# Patient Record
Sex: FEMALE | Race: WHITE
[De-identification: names, ages, dates, MRNs, and addresses within clinical notes are randomized per-mention and may not be internally consistent; named-entity substitution may affect disease eponyms.]

---

## 2017-09-24 NOTE — UC
Respiratory Complaint HPI





- HPI Summary


HPI Summary: 





26 Y/O female being seen for C/O cough, congestion, and feeling of fullness in 

sinuses and ears. Denies fever, chills, dyspnea, abdominal pain or nausea. 

States has had symptoms for approximately 1 week. Appears well, in NAD. Medical 

history and medications reviewed. 





- History of Current Complaint


Chief Complaint: UCSTDScreening


Stated Complaint: CONGESTION


Time Seen by Provider: 09/24/17 10:34


Hx Obtained From: Patient


Hx Last Menstrual Period: 8/31/17


Onset/Duration: Gradual Onset


Timing: Constant


Severity Initially: Mild


Severity Currently: Mild


Pain Intensity: 2


Pain Scale Used: 0-10 Numeric


Character: Cough: Productive


Aggravating Factors: Nothing


Associated Signs And Symptoms: Positive: Nasal Congestion





- Risk Factors


Pulmonary Embolism Risk Factors: Negative


Cardiac Risk Factors: Smoking


Pseudomonas Risk Factors: Negative


Tuberculosis Risk Factors: Negative





- Allergies/Home Medications


Allergies/Adverse Reactions: 


 Allergies











Allergy/AdvReac Type Severity Reaction Status Date / Time


 


No Known Allergies Allergy   Verified 09/24/17 10:33














PMH/Surg Hx/FS Hx/Imm Hx


Previously Healthy: Yes





- Surgical History


Surgical History: None





- Social History


Alcohol Use: None


Substance Use Type: None


Smoking Status (MU): Heavy Every Day Tobacco Smoker





Review of Systems


Constitutional: Fatigue


Skin: Negative


Eyes: Negative


ENT: Sore Throat, Sinus Congestion


Respiratory: Negative


Cardiovascular: Negative


Gastrointestinal: Negative


Genitourinary: Negative


Motor: Negative


Neurovascular: Negative


Musculoskeletal: Negative


Neurological: Negative


Psychological: Negative


Is Patient Immunocompromised?: No


All Other Systems Reviewed And Are Negative: Yes





Physical Exam


Triage Information Reviewed: Yes


Appearance: Well-Appearing


Vital Signs: 


 Initial Vital Signs











Temp  98.7 F   09/24/17 10:34


 


Pulse  85   09/24/17 10:34


 


Resp  18   09/24/17 10:34


 


BP  124/74   09/24/17 10:34


 


Pulse Ox  100   09/24/17 10:34











Vital Signs Reviewed: Yes


Eye Exam: Normal


Eyes: Positive: Conjunctiva Clear


ENT Exam: Other


ENT: Positive: Nasal drainage


Neck exam: Normal


Neck: Positive: Supple, No Lymphadenopathy


Respiratory Exam: Normal


Respiratory: Positive: Lungs clear, Normal breath sounds


Cardiovascular Exam: Normal


Cardiovascular: Positive: RRR


Abdominal Exam: Normal


Abdomen Description: Positive: Nontender


Bowel Sounds: Positive: Present


Musculoskeletal Exam: Normal


Musculoskeletal: Positive: Strength Intact


Neurological Exam: Normal


Neurological: Positive: Alert


Psychological Exam: Normal


Skin Exam: Normal





UC Diagnostic Evaluation





- Laboratory


O2 Sat by Pulse Oximetry: 100





Respiratory Course/Dx





- Differential Dx/Diagnosis


Differential Diagnosis/HQI/PQRI: Bronchitis, Lower Resp Infection


Provider Diagnoses: Upper respiratory Viral infection





Discharge





- Discharge Plan


Condition: Stable


Disposition: HOME


Patient Education Materials:  Upper Respiratory Infection (ED)


Additional Instructions: 


You likely have a viral illness, continue to drink plenty of fluids. Return to 

urgent care for increased symptoms (cough, fever, throat pain with difficulty 

swallowing, or shortness of breath). Take Mucinex as directed, increase fluids. 

Smoking cessation information provided.

## 2018-01-08 NOTE — UC
Throat Pain/Nasal Titi HPI





- HPI Summary


HPI Summary: 





Pt presents with sore throat and sinus congestion that started yesterday. She 

is concerned because she knows strep and flu are going around. Has not taken 

anything for the discomfort. Denies fever, chills, cough, SOB, chest pain, 

abdominal pain, n/v/d/c. She is still smoking








- History of Current Complaint


Chief Complaint: UCRespiratory


Stated Complaint: THROAT COMPLAINT


Time Seen by Provider: 01/08/18 12:23


Hx Obtained From: Patient


Hx Last Menstrual Period: once every 3 mo d/t bc


Onset/Duration: Gradual Onset


Severity: Moderate


Pain Intensity: 4


Pain Scale Used: 0-10 Numeric





- Allergies/Home Medications


Allergies/Adverse Reactions: 


 Allergies











Allergy/AdvReac Type Severity Reaction Status Date / Time


 


No Known Allergies Allergy   Verified 09/24/17 10:33











Home Medications: 


 Home Medications





Levonorgestrel-Ethinyl Estradi [Seasonique] 1 tab PO 01/08/18 [History]











PMH/Surg Hx/FS Hx/Imm Hx


Previously Healthy: Yes





- Surgical History


Surgical History: None





- Social History


Lives: With Family


Alcohol Use: Occasionally


Substance Use Type: None


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


Smoking Status (MU): Heavy Every Day Tobacco Smoker


Type: Cigarettes


Cessation Counseling: Counseled 3+Min - 10 Min





Review of Systems


Constitutional: Negative


Skin: Negative


Eyes: Negative


ENT: Sore Throat, Nasal Discharge, Sinus Congestion, Sinus Pain/Tenderness


Respiratory: Negative


Cardiovascular: Negative


Gastrointestinal: Negative


All Other Systems Reviewed And Are Negative: Yes





Physical Exam


Triage Information Reviewed: Yes


Appearance: Well-Appearing, No Pain Distress, Well-Nourished


Vital Signs: 


 Initial Vital Signs











Temp  98.5 F   01/08/18 11:03


 


Pulse  90   01/08/18 11:03


 


Resp  18   01/08/18 11:03


 


BP  122/77   01/08/18 11:03


 


Pulse Ox  100   01/08/18 11:03











Vital Signs Reviewed: Yes


Eyes: Positive: Conjunctiva Clear.  Negative: Conjunctiva Inflamed, Discharge


ENT: Positive: Hearing grossly normal, Pharynx normal, Nasal congestion, Nasal 

drainage, TMs normal, Sinus tenderness, Uvula midline.  Negative: Pharyngeal 

erythema, TM bulging, TM dull, TM red, Tonsillar swelling, Tonsillar exudate, 

Hoarse voice


Neck: Positive: Supple, Nontender, No Lymphadenopathy


Respiratory: Positive: Chest non-tender, Lungs clear, Normal breath sounds, No 

respiratory distress, No accessory muscle use


Cardiovascular: Positive: RRR, No Murmur, Pulses Normal


Neurological: Positive: Alert


Psychological: Positive: Age Appropriate Behavior


Skin: Negative: rashes





Throat Pain/Nasal Course/Dx





- Course


Course Of Treatment: POC strep and flu negative.  Suspect viral illness, as it 

has been 2 days - I advised pt to try conservative measures such as rest, fluids

, and tylenol/ibuprofen for discomfort. No need for antibiotic at this time.





- Differential Dx/Diagnosis


Differential Diagnosis/HQI/PQRI: Influenza, Mononucleosis, Otitis Media, 

Pharyngitis, Sinusitis, Tonsillitis, URI


Provider Diagnoses: Sore throat.  Sinus congestion





Discharge





- Discharge Plan


Condition: Stable


Disposition: HOME


Patient Education Materials:  Viral Syndrome (ED)


Referrals: 


No Primary Care Phys,NOPCP [Primary Care Provider] - 


Additional Instructions: 


If you develop a fever, shortness of breath, chest pain, new or worsening 

symptoms - please call your PCP or go to the ED.





1) May take ibuprofen 400mg every 6-8 hours as needed for pain/discomfort.

## 2019-01-12 NOTE — ED
HPI Febrile Illness





- HPI Summary


HPI Summary: 


Patient is a 29 y/o F presenting to ED with complaints of general illness for 

the past two weeks. Patient's daughter is also ill. She states that [facility], was told that she had URI. Patient 

was sick for a couple of days afterwards and then states she got better. Four 

days ago, patient began to experience fever, cough, body aches, fatigue. Three 

days of bilateral calf pain is also reported. Patient notes that she has not 

had a fever for the past 1-2 days. She states that she feels better than 

previously but notes "I'm still not 100%". On triage, pain is rated 7/10, 

nothing is noted to aggravate/alleviate Sx. Home medications and allergies are 

reviewed. 








- History of Current Complaint


Chief Complaint: EDGeneral


Time Seen by Provider: 01/12/19 08:19


Hx Obtained From: Patient


Hx Last Menstrual Period: once every 3 mo d/t bc


Onset/Duration: Started Days Ago - fever, cough, body aches, fatigue four days 

ago, three days of bilateral calf pain, Started Weeks Ago - sore throat onset ~

2 weeks ago, Still Present


Timing: Lasting Days - fever, cough, body aches, fatigue four days ago, three 

days of bilateral calf pain, Lasting Weeks - sore throat onset ~2 weeks ago


Current Severity: Severe - 7/10


Pain Intensity: 7


Pain Scale Used: 0-10 Numeric - 7/10


Aggravating Factors: Nothing


Alleviating Factors: Nothing


Associated Signs and Symptoms: Cough, Myalgia - BODY ACHES, Sore Throat, Other: 

- POSITIVE - FATIGUE, BILATERAL CALF PAIN, FEVER





- Allergy/Home Medications


Allergies/Adverse Reactions: 


 Allergies











Allergy/AdvReac Type Severity Reaction Status Date / Time


 


No Known Allergies Allergy   Verified 01/12/19 08:14











Home Medications: 


 Home Medications





NK [No Home Medications Reported]  01/12/19 [History Confirmed 01/12/19]











PMH/Surg Hx/FS Hx/Imm Hx


Musculoskeletal History: Reports: Hx Scoliosis


Sensory History: 


   Denies: Hx Legally Blind, Hx Deafness


Opthamlomology History: 


   Denies: Hx Legally Blind


EENT History: 


   Denies: Hx Deafness


Infectious Disease History: No


Infectious Disease History: Reports: Hx Hepatitis - positive antibodies


   Denies: Hx Clostridium Difficile, Hx Human Immunodeficiency Virus (HIV), Hx 

of Known/Suspected MRSA, Hx Shingles, Hx Tuberculosis, Hx Known/Suspected VRE, 

Hx Known/Suspected VRSA, History Other Infectious Disease, Traveled Outside the 

[place] in Last 30 Days





- Family History


Known Family History: Positive: Cardiac Disease





- Social History


Alcohol Use: Occasionally


Substance Use Type: Reports: None


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


Smoking Status (MU): Heavy Every Day Tobacco Smoker


Type: Cigarettes





Review of Systems


Positive: Fever, Fatigue, Other - POSITIVE - BODY ACHES 


Positive: Sore Throat


Positive: Cough


Positive: Other - POSITIVE - BILATERAL CALF PAIN 


All Other Systems Reviewed And Are Negative: Yes





Physical Exam





- Summary


Physical Exam Summary: 


VITAL SIGNS: Reviewed.


GENERAL: Patient is a well-developed and nourished female who is lying 

comfortable in the stretcher. Patient is not in any acute respiratory distress.


HEAD AND FACE: No signs of trauma. No ecchymosis, hematomas or skull 

depressions. No sinus tenderness.


EYES: PERRLA, EOMI x 2, No injected conjunctiva, no nystagmus.


EARS: Hearing grossly intact. Ear canals and tympanic membranes are within 

normal limits.


MOUTH: Oropharynx within normal limits.


NECK: Supple, trachea is midline, no adenopathy, no JVD, no carotid bruit, no c-

spine tenderness, neck with full ROM.


CHEST: Symmetric, no tenderness at palpation


LUNGS: Clear to auscultation bilaterally. No wheezing or crackles.


CVS: Regular rate and rhythm, S1 and S2 present, no murmurs or gallops 

appreciated.


ABDOMEN: Soft, non-tender. No signs of distention. No rebound no guarding, and 

no masses palpated. Bowel sounds are normal.


EXTREMITIES: FROM in all major joints, no edema, no cyanosis or clubbing.


NEURO: Alert and oriented x 3. No acute neurological deficits. Speech is normal 

and follows commands.


SKIN: Dry and warm








Triage Information Reviewed: Yes


Vital Signs On Initial Exam: 


 Initial Vitals











Temp Pulse Resp BP Pulse Ox


 


 97.0 F   97   15   131/88   98 


 


 01/12/19 08:13  01/12/19 08:13  01/12/19 08:13  01/12/19 08:13  01/12/19 08:13











Vital Signs Reviewed: Yes





Diagnostics





- Vital Signs


 Vital Signs











  Temp Pulse Resp BP Pulse Ox


 


 01/12/19 08:13  97.0 F  97  15  131/88  98














- Laboratory


Lab Statement: Any lab studies that have been ordered have been reviewed, and 

results considered in the medical decision making process.





Re-Evaluation





- Re-Evaluation


  ** First Eval


Re-Evaluation Time: 09:50


Comment: I discussed all the findings and test results with the patient. 

Patient was instructed to return to the emergency room immediately if any of 

the symptoms return or worsens. Plan of care was discussed with the patient and 

understands and agrees. All questions were answered at patient satisfaction.  

There were no further complaints or concerns. Lung exam before discharge: CTA B/

L. Good air exchange. No wheezing or crackles heard. CVS: S1 and S2 present. No 

murmurs appreciated. Patient is alert and oriented x 3. Patient is 

hemodynamically stable. Patient will be discharged home with follow up PCP in 

the next 2-3 days





Course/Dx





- Course


Assessment/Plan: Patient is a 29 y/o F presenting to ED with complaints of 

general illness for the past two weeks. Patient's daughter is also ill. She 

states that she went to [facility] for sore throat on 12/29/18, was told 

that she had URI. Patient was sick for a couple of days afterwards and then 

states she got better. Four days ago, patient began to experience fever, cough, 

body aches, fatigue. Three days of bilateral calf pain is also reported. 

Patient notes that she has not had a fever for the past 1-2 days. She states 

that she feels better than previously but notes "I'm still not 100%". On triage

, pain is rated 7/10, nothing is noted to aggravate/alleviate Sx. Home 

medications and allergies are reviewed.  Rapid strep and influenza A and B and 

negative.  I believe that the patients symptoms are secondary to an upper 

respiratory tract infection which likely viral.  I discussed all the findings 

and test results with the patient. Patient was instructed to return to the 

emergency room immediately if any of the symptoms return or worsens. Plan of 

care was discussed with the patient and understands and agrees. All questions 

were answered at patient satisfaction.  There were no further complaints or 

concerns. Lung exam before discharge: CTA B/L. Good air exchange. No wheezing 

or crackles heard. CVS: S1 and S2 present. No murmurs appreciated. Patient is 

alert and oriented x 3. Patient is hemodynamically stable. Patient will be 

discharged home with follow up PCP in the next 2-3 days





- Diagnoses


Provider Diagnoses: 


 URI (upper respiratory infection)








Discharge





- Sign-Out/Discharge


Documenting (check all that apply): Patient Departure - discharge 





- Discharge Plan


Condition: Stable


Disposition: HOME


Patient Education Materials:  Upper Respiratory Infection (ED)


Referrals: 


[facility] [Outside] - 2 Days


Additional Instructions: 


RETURN TO ED FOR ANY NEW OR WORSENING SYMPTOMS. FOLLOW UP WITH PRIMARY CARE 

PHYSICIAN WITHIN TWO DAYS. 





- Billing Disposition and Condition


Condition: STABLE


Disposition: Home





- Attestation Statements


Document Initiated by Scribe: Yes


Documenting Scribe: JULIET NASCIMENTO 


Provider For Whom Scribe is Documenting (Include Credential): ZELDA CAR MD


Scribe Attestation: 


JULIET SAGE scribed for ZELDA CAR MD on 01/12/19 at 1828. 


Scribe Documentation Reviewed: Yes


Provider Attestation: 


The documentation as recorded by the JULIET wilkins  accurately reflects 

the service I personally performed and the decisions made by ZELDA goetz MD


Status of Scribe Document: Viewed

## 2019-04-24 NOTE — XMS REPORT
Continuity of Care Document (C-CDA R2.1) (Encounter date: 2019 10:40 AM)

 Created on:2019



Patient:YRN

Sex:Female

:1990





Demographics







 Address  [address] [place] [address]

 

 Work Phone  [phone]

 

 Mobile Phone  [phone]

 

 Home Phone  [phone]

 

 Email Address  [email or web address]

 

 Preferred Language  und

 

 Marital Status  Not  or 

 

 Denominational Affiliation  Unknown

 

 Race  Unknown

 

 Additional Race(s)  Other Race

 

 Ethnic Group  Not  or 









Author







 Organization  [facility]

 

 Address  [address] [place], [place] [id number]

 

 Phone  [phone]









Support







 Name  Relationship  Address  Phone

 

 KORINA POOL  Unavailable  Unavailable  [phone]









Care Team Providers







 Name  Role  Phone

 

 Jyoti Mendieta NP  Unavailable  Unavailable









Allergies, Adverse Reactions, Alerts







 Substance  Reaction  Status

 

 No Known Allergies    Active







Medications







 Medication  Instructions  Dosage  Effective  Status  Comments



       Dates (start -    



       stop)    

 

 Depo-Provera 150  IM Q 11-13 weeks     -  Active  



 mg/mL intramuscular      May-    



 suspension          

 

 ZITHROMAX (unknown    Not Available   -  Active  



 strength)          

 

 ALBUTEROL INHALER    Not Available   -  Active  



 (unknown strength)          

 

 L norgest/E  take 1 tablet by  1.00 tablet   -  No Longer  



 estradiol-E estrad  oral route  every      Active  



 0.10 mg-20 mcg  day        



 (84)/10 mcg(7)          



 tabs,3mos          







Problems







 Condition  Effective Dates (start -  Clinical Status  Comments



   stop)    

 

 Human immunodeficiency virus [HIV]   -    



 counseling      

 

 Encounter for screening for human   -    



 immunodeficiency virus      

 

 Encntr for gyn exam (general)      



 (routine) w/o abn findings      

 

 Encounter for oth screening for      



 malignant neoplasm of breast      

 

 Encntr screen for infections w      



 sexl mode of transmiss      

 

 Encounter for initial prescription      



 of injectable contracep      

 

 Encounter for initial prescription      



 of contraceptive pills      

 

 Encounter for screening for      



 malignant neoplasm of cervix      

 

 Encntr for gyn exam (general)      



 (routine) w/o abn findings      

 

 Encounter for oth screening for      



 malignant neoplasm of breast      

 

 Encounter for surveillance of      



 injectable contraceptive      

 

 Encntr screen for infections w      



 sexl mode of transmiss      

 

 Encounter for initial prescription      



 of injectable contracep      

 

 Tobacco use      







Procedures







 Procedure  Date

 

 PREVENTIVE COUNSELING, 8-14 Minutes  

 

 HIV-1/HIV-2, SINGLE ASSAY  

 

 N.GONORRHOEAE, DNA, AMP PROB  

 

 CHYLMD DNA, AMP PROBE  

 

 SUREPATH  

 

 HPV, DNA, AMP PROBE HIGH RISK  

 

 PREV VISIT, EST, AGE 18-39  

 

 TRICHOMONAS VAGIN, DIR PROBE  

 

 INJECTION DEPO/CEFTRIAXONE  

 

 BLOOD PRESSURE  

 

 Height/Weight  

 

 BREAST EXAM  

 

 OTHER Medical Services  

 

 Contraceptive  

 

 Cns.Svc. Other  

 

 Cns.Svc. STI  

 

 DEPO  







Results







 Test Name  Date and Time  Measure  Units  Reference Range  Abnormal Flag  
Status  Comments









 No information



NOTE: This patient has pending results not included in this document.



Advance Directives







 Directive  Yes / No  Effective Date  File Name









 No information







Encounters







 Encounter  Practice  Location  Reason(s)  Diagnoses  Date  Provider  Providers



 Description      For Visit        Copied on



               Encounter

 

 PREVENTIVE  [facility]anned  [facility]  [facility]  Human  Apr-  White  Referring



 COUNSELING,  [facility]  [place]  Person  immunodeficiency    Jyoti.  
Provider:



 8-14 Minutes  [facility]    Visit  virus [HIV]  [address]  Jyoti



   Nela    (chief  counselingEncounter    [address]  [address]



   [place]    complaint)  for screening for    [address]      human    [place],  ,



   , [place],      immunodef[place],



   [place]      virusEncntr for gyn    [address],  [place], [address].



   [id number],      exam (general)    [place].  



   [place]      (routine) w/o abn      



   tel:[phone]      findingsEncounter      



   [id number]      for oth screening      



         for malignant      



         neoplasm of      



         breastEncntr screen      



         for infections w      



         sexl mode of      



         transmissEncounter      



         for initial      



         prescription of      



         injectable      



         contracep      

 

   [facility]  [facility]      Nile-0  [place]  



   [facility]  [place]      [address]  Jyoti.  



   [facility]        [address]  



   [address]          [address]  



   [place],  



   [address]          [place],  



   [address], [place],          [place],  



   [place],          [address],  



   [id number],          [place].  



   [place]            



   tel:[phone]            



   [phone]            

 

   Planned  [facility]    Encounter for  Mar-0  [place]ugg[place]  



   [facility]  [place]    initial  [address]  Simran  



   [facility]      prescription of  7  . [address]  



   [address]      contraceptive pills    [address]  



   [place],  



   [address]          [place],  



   [address], [place],          [place],  



   [place]          [address],  



   [id number],          [place].  



   [place]          tel:  



   tel:          [phone]  



   [phone]            

 

   Planned  [facility]    Encounter for  Dec-0  Guggino  Referring



   [facility]  [place]    screening for  [address]  Simran  Provider:



   Yeyo      malignant neoplasm  [address]  . [address]  [place]



   [place]      of cervixEncntr for    [address]  [place], [address]      gyn exam (general)    [address],  [address]



   [address]      (routine) w/o abn    [place],  [address],



   , [place],      findingsEncounter    [place],      for oth screening    [address],  [place], [address].



   [id number],      for malignant    [place].  tel:



   [place]      neoplasm of    tel:  [phone]



   tel:      breastEncounter for    [phone]  



   [id number]      surveillance of      



         injectable      



         contraceptiveEncntr      



         screen for      



         infections w sexl      



         mode of transmiss      

 

   Planned  [facility]    Encounter for  Sep-0  Borglum  Referring



   [facility]  [place]    initial  [address]  [place].  Provider:



   [facility]      prescription of  [address]  [place]



   [place]      injectable    [address]  [place], [address]      contracep[address]acco    [address]



   [address]      use    [place],  [address] ,



   , [place],          [place]



   [place]          [address],  [place], [address].



   [id number],          [place].  tel:



             tel:  [phone]



   tel:[phone]          [phone]  



   [phone]            







Family History







 Family Member  Diagnosis  Age At Onset

 

 1st degree relative  No hx of venous thromboembolism  

 

 Father  No history of Stroke before age 55  

 

 1st degree relative  No hx of osteoporosis  

 

 Father  No history of Myocardial infarction before age 55  

 

 1st degree relative  No hx of cancer of breast, colon, endometrium or  



   ovary  

 

 Mother  Myocardial infarction  45

 

 Mother  No history of Stroke before age 65  







Immunizations







 Vaccine  Date  Status  Comments









 No information







Payers







 Payer name  Insurance type  Covered party ID  Authorization(s)

 

 [facility]  [facility]  [id number]  







Social History







 Type  Description  Quantity  Date Captured  Comments

 

 Alcohol Use Details  Unknown      

 

 Caffeine Use  Unknown      



 Details        

 

 Tobacco Use Status        

 

 Smoking Status  Heavy tobacco smoker      

 

 Smoking Tobacco Use  Cigarette: No Details Available  Cigarette: 10 Cigarettes 
per day    



 Details        

 

 Birth Sex  Female      







Vital Signs







 Date /  Height  Weight  BMI  Pulse  Blood  Temperature  Respiratory  Body  
Head  BMI  Pulse  Inhaled



 Time:        Rate  Pressure    Rate  Surface  Circumference  percentile  Ox  Ox



                 Area        

 

   61.00  143.00  27.0    128/  in  lbs  2    mm[Hg]              



 11:17      kg/m                  



 AM      eter                  



       (2)                  







Chief Complaint And Reason For Visit

*** Most recent encounter only, dated '2019 10:40'. ***Well Person Visit (
chief complaint)



Reason For Referral







 Reason For Referral

 

 No information







Plan Of Treatment







 Date  Type  Action  Status

 

 Mar-  Goal  Tobacco cessation counseling  completed

 

 Dec-  Goal  Tobacco cessation counseling  completed

 

 Sep-  Goal  Tobacco cessation counseling  completed

 

 Sep-  Goal  Tobacco cessation counseling  completed







History Of Present Illness







 Encounter Date  Complaint  History Of Present Illness









 No information







Functional Status







 Date  Functional Assessment









 No information







Medications Administered







 Medication  Instructions  Dosage  Effective Dates (start - stop)  Status  
Comments









 No information







Instructions







 Date  Instruction  Additional Information









 No information







Assessments







 Type  Assessment  Date

 

 assessment  Human immunodeficiency virus [HIV] counseling  

 

 assessment  Encounter for screening for human immunodeficiency virus  2019

 

 assessment  Encntr for gyn exam (general) (routine) w/o abn findings  2019

 

 assessment  Encounter for oth screening for malignant neoplasm of breast  

 

 assessment  Encntr screen for infections w sexl mode of transmiss  

 

 assessment  Encounter for initial prescription of injectable contracep  2019







Goals







 Health Concern  Goal  Type  Priority  Status  Date









 No information







Medical Equipment







 Description  Device  Universal Device Identifier  Effective Dates (start - stop
)  Status









 No information







Mental Status







 Date  Cognitive Assessment

 

   Normal Orientation







Health Concerns







 Observation  Date









 No information









 Concern  Status  Date









 No information

## 2019-04-24 NOTE — UC
Skin Complaint HPI





- HPI Summary


HPI Summary: 


ABOUT 10 DAYS AGO PATIENT WENT TO [facility] AND WAS TREATED WITH A 

TAPERING DOSE OF PREDNISONE, AZITHROMYCIN AND AN INHALER FOR BRONCHITIS.  

PATIENT TOOK THE PREDNISONE FOR ABOUT ONE WEEK.  WOKE UP THIS MORNING AND HAD 

WHITE PATCHES INSIDE HER MOUTH.  NO PAIN.  WEARS DENTURES.  NO SORE THROAT OR 

FEVER.





- History of Current Complaint


Chief Complaint: UCGeneralIllness


Time Seen by Provider: 04/24/19 14:05


Stated Complaint: SORES IN MOUTH


Hx Obtained From: Patient


Hx Last Menstrual Period: 4/8/19


Onset/Duration: Sudden Onset, Lasting Hours, Still Present


Timing: Constant


Onset Severity: Mild


Current Severity: Mild


Pain Intensity: 0


Pain Scale Used: 0-10 Numeric


Aggravating Factor(s): Nothing


Alleviating Factor(s): Nothing


Associated Signs & Symptoms: Positive: Negative


Related History: Recent change in medication





- Allergy/Home Medications


Allergies/Adverse Reactions: 


 Allergies











Allergy/AdvReac Type Severity Reaction Status Date / Time


 


No Known Allergies Allergy   Verified 04/24/19 14:06











Home Medications: 


 Home Medications





Estradiol Cypionate [Depo-Estradiol] 5 mg IM MONTHLY 04/24/19 [History 

Confirmed 04/24/19]











PMH/Surg Hx/FS Hx/Imm Hx


Other History Of: Hepatitis C





- Surgical History


Surgical History: None





- Family History


Known Family History: Positive: Cardiac Disease





- Social History


Alcohol Use: Weekly


Substance Use Type: None


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


Smoking Status (MU): Heavy Every Day Tobacco Smoker


Type: Cigarettes





Review of Systems


All Other Systems Reviewed And Are Negative: Yes


Constitutional: Positive: Negative


ENT: Positive: Other - WHITE PATCHES IN  MOUTH


Respiratory: Positive: Negative


Cardiovascular: Positive: Negative


Gastrointestinal: Positive: Negative





Physical Exam


Triage Information Reviewed: Yes


Appearance: Well-Appearing, No Pain Distress, Well-Nourished


Vital Signs: 


 Initial Vital Signs











Temp  100.1 F   04/24/19 14:00


 


Pulse  106   04/24/19 14:00


 


Resp  20   04/24/19 14:00


 


BP  130/96   04/24/19 14:00


 


Pulse Ox  97   04/24/19 14:00











Vital Signs Reviewed: Yes


Eyes: Positive: Conjunctiva Clear


ENT: Positive: Hearing grossly normal, Other - SCATTERED WHITE PATCHES ON 

BUCCAL AND ORAL MUCOSA.


Neck: Positive: Supple


Respiratory: Positive: No respiratory distress, No accessory muscle use


Cardiovascular: Positive: Pulses Normal


Abdomen Description: Positive: Soft


Musculoskeletal: Positive: No Edema


Neurological: Positive: Alert


Psychological: Positive: Age Appropriate Behavior


Skin: Negative: Rashes





Course/Dx





- Diagnoses


Provider Diagnosis: 


 Oral thrush








Discharge





- Sign-Out/Discharge


Documenting (check all that apply): Patient Departure


All imaging exams completed and their final reports reviewed: No Studies





- Discharge Plan


Condition: Stable


Disposition: HOME


Prescriptions: 


Nystatin SUSPENSION* 5 ml PO QID #140 ml


Patient Education Materials:  Oral Candidiasis (ED)


Referrals: 


[facility] [Outside] - If Needed


Additional Instructions: 


ON EXAM YOU HAVE ORAL THRUSH.  THIS IS AN OPPORTUNISTIC OVERGROWTH OF A YEAST 

THAT WAS LIKELY CAUSED BY YOUR STEROID USE.  CHECK YOUR INHALER AT HOME TO 

ENSURE IT HAS NO INHALED STEROID.  IF IT DOES, BE SURE TO RINSE YOUR MOUTH WITH 

WATER AFTER EATING EACH TIME YOU USE IT.  HOLD THE MEDICINE IN YOUR MOUTH FOR 

AS LONG AS YOU CAN BEFORE SWALLOWING.  SEEK FOLLOW-UP IF YOUR SYMPTOMS ARE NOT 

IMPROVING AFTER USING THE MEDICINE FOR A WEEK.








CALL THE NUMBER BELOW FOR ASSISTANCE IN ESTABLISHING WITH A PCP


An additional resource available to assist in finding the appropriate physician 

for your health care needs is [facility] ([facility]).  

You may contact them by calling [phone].








- Billing Disposition and Condition


Condition: STABLE


Disposition: Home

## 2019-07-26 NOTE — XMS REPORT
Continuity of Care Document (C-CDA R2.1) (Encounter date: 2019 05:11 PM)

 Created on:2019



Patient:YRN

Sex:Female

:1990

External Reference #:[id number]





Demographics







 Address  [address] [place] [address]

 

 Work Phone  [phone]

 

 Mobile Phone  [phone]

 

 Home Phone  [phone]

 

 Email Address  [email or web address]

 

 Preferred Language  und

 

 Marital Status  Not  or 

 

 Mosque Affiliation  Unknown

 

 Race  Unknown

 

 Additional Race(s)  Other Race

 

 Ethnic Group  Not  or 









Author







 Organization  [facility]

 

 Address  [address] [place], [place] [id number]

 

 Phone  [phone]









Support







 Name  Relationship  Address  Phone

 

 KORINA POOL  Unavailable  Unavailable  [phone]









Care Team Providers







 Name  Role  Phone

 

 Karla Vogt MD  Unavailable  Unavailable









Allergies, Adverse Reactions, Alerts







 Substance  Reaction  Status

 

 No Known Allergies    Active







Medications







 Medication  Instructions  Dosage  Effective Dates  Status  Comments



       (start - stop)    

 

 Depo-Provera 150  IM Q 11-13 weeks     -  Active  



 mg/mL intramuscular      May-    



 suspension          

 

 ZITHROMAX (unknown    Not Available   -  Active  



 strength)          

 

 ALBUTEROL INHALER    Not Available   -  Active  



 (unknown strength)          







Problems







 Condition  Effective Dates  Clinical Status  Comments



   (start - stop)    

 

 Carcinoma in situ of cervix,      



 unspecified      

 

 Anogenital (venereal) warts      

 

 High grade intrepith lesion      



 cyto smr crvx (HGSIL)      

 

 Cervical high risk HPV DNA      



 test positive      

 

 Personal history of cervical      



 dysplasia      

 

 Human immunodeficiency virus   -    



 [HIV] counseling      

 

 Encounter for screening for   -    



 human immunodeficiency virus      

 

 Encntr for gyn exam      



 (general) (routine) w/o abn      



 findings      

 

 Encounter for oth screening      



 for malignant neoplasm of      



 breast      

 

 Encntr screen for infections      



 w sexl mode of transmiss      

 

 Encounter for initial      



 prescription of injectable      



 contracep      

 

 Encounter for initial      



 prescription of      



 contraceptive pills      

 

 Encounter for screening for      



 malignant neoplasm of cervix      

 

 Encntr for gyn exam      



 (general) (routine) w/o abn      



 findings      

 

 Encounter for oth screening      



 for malignant neoplasm of      



 breast      

 

 Encounter for surveillance      



 of injectable contraceptive      

 

 Encntr screen for infections      



 w sexl mode of transmiss      

 

 Encounter for initial      



 prescription of injectable      



 contracep      

 

 Tobacco use      

 

 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



 cervix      







Procedures







 Procedure  Date









 No information







Results







 Test Name  Date and Time  Measure  Units  Reference Range  Abnormal Flag  
Status  Comments









 No information







Advance Directives







 Directive  Yes / No  Effective Date  File Name









 No information







Encounters







 Encounter  Practice  Location  Reason(s)  Diagnoses  Date  Provider  Providers



 Description      For Visit        Copied on



               Encounter

 

   [facility]anned  [facility]        Torie  



   [facility]  [place]        Karla.  



   [facility]        [address]  



   Finger          [place]  



   [place],  



   [address] [place]          [place],  



   [address], [place],          [place],  



   [place],          [address].  



   [id number],          tel:[phone]  



   [place]          [id number]  



   tel:[phone]            



   [phone]            

 

   Planned  [facility]    Carcinoma in situ  May-0  Torie  



   [facility]  [place]    of cervix,    Karla.  



   [facility]      unspecifiedAnogenit  [address]  



   Finger      al (venereal) warts    [place]  



   [place],  



   [address] [place]          [place],  



   [address], [place],          [place],  



   [place],          [address].  



   [id number],          tel:[phone]  



   [place]          [id number]  



   tel:[phone]            



   [phone]            

 

   Planned  [facility]    High grade  May-  Torie  Referring



   [facility]  [place]    intrepith lesion    Karla.  Provider:



   Yeyo      cyto smr crvx  [address]  Karla



   Finger      (HGSIL)Cervical    [place]  Torie R,



   [place]      high risk HPV DNA    [address]



   [address] [place]      test    [place],  [address],



   [address], [place],      [place]Personal    [place],  [place],



   [place],      history of cervical    [address].  [place], [address].



   [id number],      dysplasia    tel:[phone]  tel:[phone]



   [place]          [id number]  [id number]



   tel:[phone]            



   [phone]            

 

   Planned  [facility]    Human  Apr-  White  Referring



   [facility]  [place]    immunodeficiency    Jyoti.  Provider:



   [facility]      virus [HIV]  [address]  Jyoti



   Finger      counselingEncounter    [place]  [place]



   [address]      for screening for    [address],  [address] [place]



   [address] [place]      human    [place],  St,



   St, [place],      immunodeficiency    [place],



   [place],      virusEncntr for gyn    [address],  [place], [address].



   [id number],      exam (general)    [place].  



   [place]      (routine) w/o abn      



   tel:[phone]      findingsEncounter      



   [phone]      for oth screening      



         for malignant      



         neoplasm of      



         breastEncntr screen      



         for infections w      



         sexl mode of      



         transmissEncounter      



         for initial      



         prescription of      



         injectable      



         contracep      

 

   Planned  [facility]    Encounter for  Mar-0  Guggino  



   [facility]  [place]    initial  2-201  Simran  



   [facility]      prescription of  7  . [address]  



   Finger      contraceptive pills    [place]  



   [place],  



   [address] [place]          [place],  



   [address], [place],          [place],  



   [place],          [address],  



   [id number],          [place].  



   [place]          tel:  



   tel:          [phone]  



   [phone]            

 

   Planned  [facility]    Encounter for  Dec-0  Guggino  Referring



   [facility]  [place]    screening for  6-201  Simran  Provider:



   [facility]      malignant neoplasm  [address]  . [address]  Karla



   Finger      of cervixEncntr for    [place]  Torie R,



   [place]      gyn exam (general)    [address],  [address]



   [address] [place]      (routine) w/o abn    [place],  [address],



   , [place],      findingsEncounter    [place],  [place],



   [place],      for oth screening    [address],  [place], [address].



   [id number],      for malignant    [place].  tel:



   [place]      neoplasm of    tel:  [phone]



   tel:      breastEncounter for    [phone]  



   [id number]      surveillance of      



         injectable      



         contraceptiveEncntr      



         screen for      



         infections w sexl      



         mode of transmiss      

 

   Planned  [facility]    Encounter for  Sep-0  Borglum  Referring



   [facility]  [place]    initial  8-201  [place].  Provider:



   [facility]      prescription of  [address]  Karla



   Finger      injectable    [place]  Torie R,



   [place]      contracepTobacco    [address]



   [address] [place]      use    [place],  [address],



   , [place],          [place],



   [place]          [address],  [place], [address].



   [id number],          [place].  tel:



   [place]          tel:  [phone]



   tel:+          [phone]  



   [phone]            







Family History







 Family Member  Diagnosis  Age At Onset

 

 1st degree relative  No hx of venous thromboembolism  

 

 Father  No history of Stroke before age 55  

 

 1st degree relative  No hx of osteoporosis  

 

 Father  No history of Myocardial infarction before age 55  

 

 1st degree relative  No hx of cancer of breast, colon, endometrium or  



   ovary  

 

 Mother  Myocardial infarction  45

 

 Mother  No history of Stroke before age 65  







Immunizations







 Vaccine  Date  Status  Comments









 No information







Payers







 Payer name  Insurance type  Covered party ID  Authorization(s)

 

 [facility]  [place]  [id number]  







Social History







 Type  Description  Quantity  Date Captured  Comments

 

 Alcohol Use Details  Unknown      

 

 Caffeine Use Details  Unknown      

 

 Tobacco Use Status  Unknown      

 

 Smoking Status  Heavy tobacco smoker      

 

 Birth Sex  Female      







Vital Signs







 Date /  Height  Weight  BMI  Pulse  Blood  Temperature  Respiratory  Body  
Head  BMI  Pulse  Inhaled



 Time:        Rate  Pressure    Rate  Surface  Circumference  percentile  Ox  Ox



                 Area        









 No information







Chief Complaint And Reason For Visit

No information



Reason For Referral







 Reason For Referral

 

 No information







Plan Of Treatment







 Date  Type  Action  Status

 

 May-  Goal  Tobacco cessation counseling  completed

 

 Mar-  Goal  Tobacco cessation counseling  completed

 

 Dec-  Goal  Tobacco cessation counseling  completed

 

 Sep-  Goal  Tobacco cessation counseling  completed

 

 Sep-  Goal  Tobacco cessation counseling  completed

 

 May-  Referral   Ordered:  ordered



     Referrals: Gynecologist. Follow-up and treat  

 

   Appointment  RONALDO POOL  BOOKED







History Of Present Illness







 Encounter Date  Complaint  History Of Present Illness









 No information







Functional Status







 Date  Functional Assessment









 No information







Medications Administered







 Medication  Instructions  Dosage  Effective Dates (start - stop)  Status  
Comments









 No information







Instructions







 Date  Instruction  Additional Information









 No information







Assessments







 Type  Assessment  Date









 No information







Goals







 Health Concern  Goal  Type  Priority  Status  Date









 No information







Medical Equipment







 Description  Device  Universal Device Identifier  Effective Dates (start - stop
)  Status









 No information







Mental Status







 Date  Cognitive Assessment









 No information







Health Concerns







 Observation  Date









 No information









 Concern  Status  Date









 No information

## 2019-07-26 NOTE — XMS REPORT
Continuity of Care Document (C-CDA R2.1) (Encounter date: 2019 12:00 PM)

 Created on:2019



Patient:YRN

Sex:Female

:1990

External Reference #:[id number]





Demographics







 Address  [address] [place] [address]

 

 Work Phone  [phone]

 

 Mobile Phone  [phone]

 

 Home Phone  [phone]

 

 Email Address  [email or web address]

 

 Preferred Language  und

 

 Marital Status  Not  or 

 

 Confucianism Affiliation  Unknown

 

 Race  Unknown

 

 Additional Race(s)  Other Race

 

 Ethnic Group  Not  or 









Author







 Organization  [facility]

 

 Address  [address] [place], [place] [address]

 

 Phone  [phone]









Support







 Name  Relationship  Address  Phone

 

 KORINA POOL  Unavailable  Unavailable  [phone]









Care Team Providers







 Name  Role  Phone

 

 Jyoti Mendieta NP  Unavailable  Unavailable

 

 PPS[facility], NURSE OR MA  Unavailable  Unavailable









Allergies, Adverse Reactions, Alerts







 Substance  Reaction  Status

 

 No Known Allergies    Active







Medications







 Medication  Instructions  Dosage  Effective Dates  Status  Comments



       (start - stop)    

 

 Depo-Provera 150  IM Q 11-13 weeks     -  Active  



 mg/mL intramuscular      May-    



 suspension          

 

 ZITHROMAX (unknown    Not Available   -  Active  



 strength)          

 

 ALBUTEROL INHALER    Not Available   -  Active  



 (unknown strength)          







Problems







 Condition  Effective Dates  Clinical Status  Comments



   (start - stop)    

 

 Encounter for surveillance      



 of injectable contraceptive      

 

 Carcinoma in situ of cervix,      



 unspecified      

 

 Anogenital (venereal) warts      

 

 High grade intrepith lesion      



 cyto smr crvx (HGSIL)      

 

 Cervical high risk HPV DNA      



 test positive      

 

 Personal history of cervical      



 dysplasia      

 

 Human immunodeficiency virus   -    



 [HIV] counseling      

 

 Encounter for screening for   -    



 human immunodeficiency virus      

 

 Encntr for gyn exam      



 (general) (routine) w/o abn      



 findings      

 

 Encounter for oth screening      



 for malignant neoplasm of      



 breast      

 

 Encntr screen for infections      



 w sexl mode of transmiss      

 

 Encounter for initial      



 prescription of injectable      



 contracep      

 

 Encounter for initial      



 prescription of      



 contraceptive pills      

 

 Encounter for screening for      



 malignant neoplasm of cervix      

 

 Encntr for gyn exam      



 (general) (routine) w/o abn      



 findings      

 

 Encounter for oth screening      



 for malignant neoplasm of      



 breast      

 

 Encounter for surveillance      



 of injectable contraceptive      

 

 Encntr screen for infections      



 w sexl mode of transmiss      

 

 Encounter for initial      



 prescription of injectable      



 contracep      

 

 Tobacco use      

 

 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



 cervix      







Procedures







 Procedure  Date

 

 INJECTION DEPO/CEFTRIAXONE  

 

 INJECTION OR LAB ONLY VISIT EST  

 

 OTHER Medical Services  

 

 Contraceptive  

 

 Cns.Svc. Other  

 

 Cns.Svc. STI  

 

 DEPO  







Results







 Test Name  Date and Time  Measure  Units  Reference Range  Abnormal Flag  
Status  Comments









 No information







Advance Directives







 Directive  Yes / No  Effective Date  File Name









 No information







Encounters







 Encounter  Practice  Location  Reason(s)  Diagnoses  Date  Provider  Providers



 Description      For Visit        Copied on



               Encounter

 

   [facility]  [facility]    Encounter for    [place]  Referring



   [facility]  [place]    surveillance of    [place].  Provider:



   Yeyo      injectable  [address]  Jy[place]



   Finger      contraceptive    [place]  [place]



   [address],  [address] [place]



   [address] [place]          [place],  [address],



   [address], [place],



   [place],          [address],  [place],



   [id number],          [place].  [address].Cons



   [place]            ulting



   tel:+            Provider:



   [id number]            NURSE OR



               MA [facility].

 

   [facility]anned  [facility]    Carcinoma in situ  May-0  Torie  



   [facility]    [facility],    Karla.  



   Yeyo      unspecifiedAnogenit  [address]  



   Finger      al (venereal) warts    [place]  



   [place],  



   [address] [place]          [place],  



   [address], [place],          [place],  



   [place],          [address].  



   [id number],          tel:+160  



   [place]          [id number]  



   tel:+72            



   [id number]            

 

   [facility]anned  [facility]    High grade  May-0  Torie  Referring



   [facility]    intrep[facility] lesion    Karla.  Provider:



   Yeyo      cyto smr crvx  [address]  Karla



   Finger      (HGSIL)Cervical    [place]  Torie R,



   [place]      high risk HPV DNA    [address],  [address]



   [address] [place]      test    [place],  [address],



   [address], [place],      [place]    [place],



   [place]      history of [place]    [address].  [place], [address].



   [id number],      dysplasia    tel:+60  tel:[phone]



   [place]          [id number]  [id number]



   tel:[phone]            



   [address]            

 

   [facility]anned  [facility]    Human  Apr-  White  Referring



   [facility]  [place]    immunodeficiency    Jy[place].  Provider:



   Yeyo      virus [HIV]  [address]  Jyoti



   Finger      counselingEncounter    [place]  [place]



   [place]      for screening for    [address],  [address] [place]



   [address] [place]      human    [place],  St,



   St, [place],      immunodeficiency    [place],



   [place],      virusEncntr for gyn    [address],  [place], [address].



   [id number],      exam (general)    [place].  



   [place]      (routine) w/o abn      



   tel:+      findingsEncounter      



   [phone]      for oth screening      



         for malignant      



         neoplasm of      



         breastEncntr screen      



         for infections w      



         sexl mode of      



         transmissEncounter      



         for initial      



         prescription of      



         injectable      



         contracep      

 

   Planned  [facility]    Encounter for  Mar-0  Guggino  



   [facility]  [place]    initial  2-201  Simran  



   [facility]      prescription of  7  . [address]  



   [place]      contraceptive pills    [place]  



   [place],  



   [address] [place]          [place],  



   [address], [place],          [place]  



   [place],          [address],  



   [id number],          [place].  



   [place]          tel:  



   tel:          [phone]  



   [phone]            

 

   Planned  [facility]    Encounter for  Dec-0  Guggino  Referring



   [facility]  [place]    screening for  6-201  Simran  Provider:



   [facility]      malignant neoplasm  6  . [address]  Karla



   [place]      of cervixEncntr for    [place]  Torie R,



   [place]      gyn exam (general)    [address],  [address]



   [address] [place]      (routine) w/o abn    [place],  [address],



   [address], [place],      findingsEncounter    [place],



   [place],      for [place]th screening    [address],  [place], [address].



   [id number],      for malignant    [place].  tel:



   [place]      neoplasm of    tel:  [phone]



   tel:      breastEncounter for    [id number]  



   [id number]      surveillance of      



         injectable      



         contraceptiveEncntr      



         screen for      



         infections w sexl      



         mode of transmiss      

 

   Planned  [facility]    Encounter for  Sep-0  Borglum  Referring



   [facility]  [place]    initial  8-201  [place].  Provider:



   [facility]      prescription of  [address]  Karla



   Finger      injectable    [place]  Torie R,



   [place]      contracepTobacco    [address]



   [address] [place]      use    [place],  [address],



   [address], [place],          [place],



   [place]          [address],  [place], [address].



   [id number],          [place].  tel:



   [place]          tel:  [phone]



   tel:          [phone]  



   [id number]            







Family History







 Family Member  Diagnosis  Age At Onset

 

 1st degree relative  No hx of venous thromboembolism  

 

 Father  No history of Stroke before age 55  

 

 1st degree relative  No hx of osteoporosis  

 

 Father  No history of Myocardial infarction before age 55  

 

 1st degree relative  No hx of cancer of breast, colon, endometrium or  



   ovary  

 

 Mother  Myocardial infarction  45

 

 Mother  No history of Stroke before age 65  







Immunizations







 Vaccine  Date  Status  Comments









 No information







Payers







 Payer name  Insurance type  Covered party ID  Authorization(s)

 

 [facility]  [employer]  [id number]  







Social History







 Type  Description  Quantity  Date Captured  Comments

 

 Alcohol Use Details  Unknown      

 

 Caffeine Use Details  Unknown      

 

 Tobacco Use Status        

 

 Smoking Status  Heavy tobacco smoker      

 

 Birth Sex  Female      







Vital Signs







 Date /  Height  Weight  BMI  Pulse  Blood  Temperature  Respiratory  Body  
Head  BMI  Pulse  Inhaled



 Time:        Rate  Pressure    Rate  Surface  Circumference  percentile  Ox  Ox



                 Area        









 No information







Chief Complaint And Reason For Visit

No information



Reason For Referral







 Reason For Referral

 

 No information







Plan Of Treatment







 Date  Type  Action  Status

 

 May-  Goal  Tobacco cessation counseling  completed

 

 Mar-  Goal  Tobacco cessation counseling  completed

 

 Dec-  Goal  Tobacco cessation counseling  completed

 

 Sep-  Goal  Tobacco cessation counseling  completed

 

 Sep-  Goal  Tobacco cessation counseling  completed

 

 May-  Referral   Ordered:  ordered



     Referrals: Gynecologist. Follow-up and treat  







History Of Present Illness







 Encounter Date  Complaint  History Of Present Illness









 No information







Functional Status







 Date  Functional Assessment









 No information







Medications Administered







 Medication  Instructions  Dosage  Effective Dates (start - stop)  Status  
Comments









 No information







Instructions







 Date  Instruction  Additional Information









 No information







Assessments







 Type  Assessment  Date

 

 assessment  Encounter for surveillance of injectable contraceptive  







Goals







 Health Concern  Goal  Type  Priority  Status  Date









 No information







Medical Equipment







 Description  Device  Universal Device Identifier  Effective Dates (start - stop
)  Status









 No information







Mental Status







 Date  Cognitive Assessment









 No information







Health Concerns







 Observation  Date









 No information









 Concern  Status  Date









 No information

## 2019-07-26 NOTE — XMS REPORT
Continuity of Care Document (C-CDA R2.1) (Encounter date: 07/10/2019 11:27 AM)

 Created on:2019



Patient:YRN

Sex:Female

:1990

External Reference #:[id number]





Demographics







 Address  [address] [place] [address]

 

 Work Phone  [phone]

 

 Mobile Phone  [phone]

 

 Home Phone  [phone]

 

 Email Address  [email or web address]

 

 Preferred Language  und

 

 Marital Status  Not  or 

 

 Holiness Affiliation  Unknown

 

 Race  Unknown

 

 Additional Race(s)  Other Race

 

 Ethnic Group  Not  or 









Author







 Organization  [facility]

 

 Address  [address], [place] [id number]

 

 Phone  [phone]









Support







 Name  Relationship  Address  Phone

 

 KORINA POOL  Unavailable  Unavailable  [phone]









Care Team Providers







 Name  Role  Phone

 

 Doretha Boston  Unavailable  Unavailable









Allergies, Adverse Reactions, Alerts







 Substance  Reaction  Status

 

 No Known Allergies    Active







Medications







 Medication  Instructions  Dosage  Effective Dates  Status  Comments



       (start - stop)    

 

 Depo-Provera 150  IM Q 11-13 weeks     -  Active  



 mg/mL intramuscular      May-    



 suspension          

 

 ZITHROMAX (unknown    Not Available   -  Active  



 strength)          

 

 ALBUTEROL INHALER    Not Available   -  Active  



 (unknown strength)          







Problems







 Condition  Effective Dates (start -  Clinical Status  Comments



   stop)    

 

 Carcinoma in situ of cervix,      



 unspecified      

 

 Anogenital (venereal) warts      

 

 High grade intrepith lesion cyto      



 smr crvx (HGSIL)      

 

 Cervical high risk HPV DNA test      



 positive      

 

 Personal history of cervical      



 dysplasia      

 

 Human immunodeficiency virus [HIV]   -    



 counseling      

 

 Encounter for screening for human   -    



 immunodeficiency virus      

 

 Encntr for gyn exam (general)      



 (routine) w/o abn findings      

 

 Encounter for oth screening for      



 malignant neoplasm of breast      

 

 Encntr screen for infections w      



 sexl mode of transmiss      

 

 Encounter for initial prescription      



 of injectable contracep      

 

 Encounter for initial prescription      



 of contraceptive pills      

 

 Encounter for screening for      



 malignant neoplasm of cervix      

 

 Encntr for gyn exam (general)      



 (routine) w/o abn findings      

 

 Encounter for oth screening for      



 malignant neoplasm of breast      

 

 Encounter for surveillance of      



 injectable contraceptive      

 

 Encntr screen for infections w      



 sexl mode of transmiss      

 

 Encounter for initial prescription      



 of injectable contracep      

 

 Tobacco use      







Procedures







 Procedure  Date









 No information







Results







 Test Name  Date and Time  Measure  Units  Reference Range  Abnormal Flag  
Status  Comments









 No information







Advance Directives







 Directive  Yes / No  Effective Date  File Name









 No information







Encounters







 Encounter  Practice  Location  Reason(s)  Diagnoses  Date  Provider  Providers



 Description      For Visit        Copied on



               Encounter

 

   [facility]        [place]  



   [facility]  [place]      [address]  Doretha.  



   [facility]        [address]  



   [address]          [place]  



   [place]          [place],  



   [address], [place],          [place],  



   [place],          [address].  



   [id number],          tel:+  



   [place]          [phone]  



   tel:+            



   [phone]            

 

   [facility]  [facility]    Carcinoma in situ  May-0  Torie  



   [facility]  [place]    of cervix,  7  Karla.  



   [facility]      unspecifiedAnogenit  [address]  



   [address]      al (venereal) warts    [place]  



   [place]  



   [address]          [place],  



   [address], [place],          [place],  



   [place],          [address].  



   [id number],          tel:+[phone]  



   [place]          [phone]  



   tel:+[phone]            



   [phone]            

 

   [facility]  [facility]    High grade  May-  Torie  Referring



   [facility]  [place]    intrepith lesion  2-  Karla.  Provider:



   [facility]      cyto smr crvx  [address]  [place]



   [place]      (HGSIL)Cervical    [place]  [place] R,



   [place]      high risk HPV DNA    [address] [place]      test    [place],  [address],



   [address], [place],      positivePersonal    [place],  [place]



   [place]      history of cervical    [address].  [place], [address].



   [id number],      dysplasia    tel:+60  tel:+[phone]



   [place]          [phone]  [phone]



   tel:+            



   [phone]            

 

   [facility]  [facility]    Human  Apr-  [place]  Referring



   [facility]  [place]    immunodeficiency    [place].  Provider:



   [facility]      virus [HIV]  [address]  [place]



   [place]      counselingEncounter    [place]  [place]



   [place]      for screening for    [address]



   [address]      human    [place],  [address],



   [address], [place],      immunodeficiency    [place],



   [place],      virusEncntr for gyn    [address],  [place], [address].



   [id number],      exam (general)    [place].  



   [place]      (routine) w/o abn      



   tel:+72      findingsEncounter      



   [id number]      for oth screening      



         for malignant      



         neoplasm of      



         breastEncntr screen      



         for infections w      



         sexl mode of      



         transmissEncounter      



         for initial      



         prescription of      



         injectable      



         contracep      

 

   [facility]  [facility]    Encounter for  Mar-0  Guggino  



   [facility]  [place]    initial  [address]  Simran  



   [facility]      prescription of  7  . [address]  



   [place]      contraceptive pills    [place]  



   [place],  



   [address]          [place],  



   [address], [place],          [place],  



   [place],          [address],  



   [phone],          [place].  



   [place]          tel:  



   tel:          [phone]  



   [phone]            

 

   [facility]  [facility]    Encounter for  Dec-0  [place]uggino  Referring



   [facility]  [place]    screening for  [address]  Simran  Provider:



   [facility]      malignant neoplasm  [address]  . [address]  [place]



   [place]      of cervixEncntr for    [place]  [place], [address]      gyn exam (general)    [address]



   [address] [place]      (routine) w/o abn    [place],  [address],



   [address], [place],      findingsEncounter    [place],



   [place],      for oth screening    [address],  [place], [address].



   [id number],      for malignant    [place].  tel:



   [place]      neoplasm of    tel:  [phone]



   tel:      breastEncounter for    [phone]  



   [id number]      surveillance of      



         injectable      



         contraceptiveEncntr      



         screen for      



         infections w sexl      



         mode of transmiss      

 

   [facility]  [facility]    Encounter for  Sep-0  Borglum  Referring



   [facility]  [place]    initial  [address]  [place].  Provider:



   [facility]      prescription of  [address]  [place]



   [place]      injectable    [place]  [place], [address]      contracepTobacco    [address] [place]      use    [place],  [address],



   [address], [place],          [place],  [place]



   [place]          [address],  [place], [address].



   [phone],          [place].  tel:



   [place]          tel:  [phone]



   tel:          [phone]  



   [phone]            







Family History







 Family Member  Diagnosis  Age At Onset

 

 1st degree relative  No hx of venous thromboembolism  

 

 Father  No history of Stroke before age 55  

 

 1st degree relative  No hx of osteoporosis  

 

 Father  No history of Myocardial infarction before age 55  

 

 1st degree relative  No hx of cancer of breast, colon, endometrium or  



   ovary  

 

 Mother  Myocardial infarction  45

 

 Mother  No history of Stroke before age 65  







Immunizations







 Vaccine  Date  Status  Comments









 No information







Payers







 Payer name  Insurance type  Covered party ID  Authorization(s)

 

 Oh JAMES [facility]  CI  [id number]  







Social History







 Type  Description  Quantity  Date Captured  Comments

 

 Alcohol Use Details  Unknown    Jul-  

 

 Caffeine Use Details  Unknown    Jul-  

 

 Tobacco Use Status      Jul-  

 

 Smoking Status  Heavy tobacco smoker    Jul-  

 

 Birth Sex  Female      







Vital Signs







 Date /  Height  Weight  BMI  Pulse  Blood  Temperature  Respiratory  Body  
Head  BMI  Pulse  Inhaled



 Time:        Rate  Pressure    Rate  Surface  Circumference  percentile  Ox  Ox



                 Area        









 No information







Chief Complaint And Reason For Visit

No information



Reason For Referral







 Reason For Referral

 

 No information







Plan Of Treatment







 Date  Type  Action  Status

 

 May-  Goal  Tobacco cessation counseling  completed

 

 Mar-  Goal  Tobacco cessation counseling  completed

 

 Dec-  Goal  Tobacco cessation counseling  completed

 

 Sep-  Goal  Tobacco cessation counseling  completed

 

 Sep-  Goal  Tobacco cessation counseling  completed

 

 May-  Referral   Ordered:  ordered



     Referrals: Gynecologist. Follow-up and treat  

 

   Appointment  RONALDO POOL  BOOKED







History Of Present Illness







 Encounter Date  Complaint  History Of Present Illness









 No information







Functional Status







 Date  Functional Assessment









 No information







Medications Administered







 Medication  Instructions  Dosage  Effective Dates (start - stop)  Status  
Comments









 No information







Instructions







 Date  Instruction  Additional Information









 No information







Assessments







 Type  Assessment  Date









 No information







Goals







 Health Concern  Goal  Type  Priority  Status  Date









 No information







Medical Equipment







 Description  Device  Universal Device Identifier  Effective Dates (start - stop
)  Status









 No information







Mental Status







 Date  Cognitive Assessment









 No information







Health Concerns







 Observation  Date









 No information









 Concern  Status  Date









 No information
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
## 2019-08-06 NOTE — UC
Abdominal Pain Female HPI





- HPI Summary


HPI Summary: 





30 yo female presents with diarrhea and vomiting. She tells me that 3 days ago 

she had a birthday part for her daughter and one of the children had "the 

stomach bug". Pt and her daughter developed abdominal pain, vomiting, and 

diarrhea 2 days ago that persisted into yesterday. Today pt is feeing better 

and has had no episodes of diarrhea or vomiting. She has drank today, but still 

doesn't feel like eating. Pain has resolved. Denies fever, chills, SOB, 

dysuria. She is on the depo shot.





- History of Current Complaint


Chief Complaint: UCAbdominalPain


Stated Complaint: DIARRHEA VOMITING FEVER


Time Seen by Provider: 08/06/19 11:30


Hx Obtained From: Patient


Hx Last Menstrual Period: depo


Onset/Duration: Sudden Onset


Severity Initially: Moderate


Severity Currently: Mild


Pain Intensity: 4


Allergies/Adverse Reactions: 


 Allergies











Allergy/AdvReac Type Severity Reaction Status Date / Time


 


No Known Allergies Allergy   Verified 08/06/19 11:05














PMH/Surg Hx/FS Hx/Imm Hx





- Additional Past Medical History


Additional PMH: 





None


Other History Of: Hepatitis C





- Surgical History


Surgical History: None





- Family History


Known Family History: Positive: Cardiac Disease





- Social History


Lives: With Family


Alcohol Use: Weekly


Substance Use Type: None


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


Smoking Status (MU): Heavy Every Day Tobacco Smoker


Type: Cigarettes





Review of Systems


All Other Systems Reviewed And Are Negative: Yes


Constitutional: Positive: Negative


Skin: Positive: Negative


Respiratory: Positive: Negative


Cardiovascular: Positive: Negative


Gastrointestinal: Positive: Vomiting - resolved, Diarrhea - resolved, Nausea


Neurovascular: Positive: Negative


Neurological: Positive: Negative


Psychological: Positive: Negative





Physical Exam





- Summary


Physical Exam Summary: 





GENERAL: NAD. WDWN. No pain distress.


SKIN: No rashes, sores, lesions, or open wounds.


NECK: Supple. Nontender. No lymphadenopathy. 


CHEST:  CTAB. No r/r/w. No accessory muscle use. Breathing comfortably and in 

no distress.


CV:  RRR. Without m/r/g. Pulses intact. Cap refill <2seconds


ABDOMEN:  Soft. NTTP. No distention or guarding. No CVA tenderness. Bowel 

sounds present


NEURO: Alert. 


PSYCH: Age appropriate behavior.


Triage Information Reviewed: Yes


Vital Signs: 


 Initial Vital Signs











Temp  98.8 F   08/06/19 11:03


 


Pulse  100   08/06/19 11:03


 


Resp  20   08/06/19 11:03


 


BP  137/87   08/06/19 11:03


 


Pulse Ox  100   08/06/19 11:03











Vital Signs Reviewed: Yes





Abd Pain Female Course/Dx





- Course


Course Of Treatment: 





Pt is well appearing, afebrile, and has tolerated po liquids without vomiting 

or diarrhea today.


Suspect gastroenteritis that has resolved/is resolving.


Will rx for zofran for nausea and encouraged to advance diet as tolerated





- Differential Dx/Diagnosis


Provider Diagnosis: 


 Gastroenteritis








Discharge





- Sign-Out/Discharge


Documenting (check all that apply): Patient Departure


All imaging exams completed and their final reports reviewed: No Studies





- Discharge Plan


Condition: Stable


Disposition: HOME


Prescriptions: 


Ondansetron ODT TAB* [Zofran 4 MG Odt TAB*] 4 mg PO Q8H PRN #12 tab.odt


 PRN Reason: Nausea


Patient Education Materials:  Gastroenteritis (DC)


Forms:  *Work Release


Referrals: 


No Primary Care Phys,NOPCP [Primary Care Provider] - 


Additional Instructions: 


If you develop a fever, shortness of breath, chest pain, new or worsening 

symptoms - please call your PCP or go to the ED immediately.


 


Advance your diet as tolerated





- Billing Disposition and Condition


Condition: STABLE


Disposition: Home

## 2019-10-01 NOTE — XMS REPORT
Continuity of Care Document (C-CDA R2.1) (Encounter date: 2019 09:53 AM)

 Created on:2019



Patient:YRN

Sex:Female

:1990

External Reference #:[id number]





Demographics







 Address  [address] [address]

 

 Work Phone  [phone]

 

 Mobile Phone  [phone]

 

 Home Phone  [phone]

 

 Email Address  [email or web address]

 

 Preferred Language  und

 

 Marital Status  Not  or 

 

 Spiritism Affiliation  Unknown

 

 Race  Unknown

 

 Additional Race(s)  Other Race

 

 Ethnic Group  Not  or 









Author







 Organization  [facility]

 

 Address  [address] [place] [address]

 

 Phone  [phone]









Support







 Name  Relationship  Address  Phone

 

 KORINA POOL  Unavailable  Unavailable  [phone]









Care Team Providers







 Name  Role  Phone

 

 Karla Vogt MD  Unavailable  Unavailable









Allergies, Adverse Reactions, Alerts







 Substance  Reaction  Status

 

 No Known Allergies    Active







Medications







 Medication  Instructions  Dosage  Effective Dates  Status  Comments



       (start - stop)    

 

 Depo-Provera 150  IM Q 11-13 weeks     -  Active  



 mg/mL intramuscular      May-    



 suspension          

 

 ZITHROMAX (unknown    Not Available   -  Active  



 strength)          

 

 ALBUTEROL INHALER    Not Available   -  Active  



 (unknown strength)          







Problems







 Condition  Effective Dates  Clinical Status  Comments



   (start - stop)    

 

 Encounter for surveillance      



 of injectable contraceptive      

 

 Carcinoma in situ of cervix,      



 unspecified      

 

 Anogenital (venereal) warts      

 

 High grade intrepith lesion      



 cyto smr crvx (HGSIL)      

 

 Cervical high risk HPV DNA      



 test positive      

 

 Personal history of cervical      



 dysplasia      

 

 Human immunodeficiency virus   -    



 [HIV] counseling      

 

 Encounter for screening for   -    



 human immunodeficiency virus      

 

 Encntr for gyn exam      



 (general) (routine) w/o abn      



 findings      

 

 Encounter for oth screening      



 for malignant neoplasm of      



 breast      

 

 Encntr screen for infections      



 w sexl mode of transmiss      

 

 Encounter for initial      



 prescription of injectable      



 contracep      

 

 Encounter for initial      



 prescription of      



 contraceptive pills      

 

 Encounter for screening for      



 malignant neoplasm of cervix      

 

 Encntr for gyn exam      



 (general) (routine) w/o abn      



 findings      

 

 Encounter for oth screening      



 for malignant neoplasm of      



 breast      

 

 Encounter for surveillance      



 of injectable contraceptive      

 

 Encntr screen for infections      



 w sexl mode of transmiss      

 

 Encounter for initial      



 prescription of injectable      



 contracep      

 

 Tobacco use      

 

 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



 cervix      







Procedures







 Procedure  Date









 No information







Results







 Test Name  Date and Time  Measure  Units  Reference Range  Abnormal Flag  
Status  Comments









 No information







Advance Directives







 Directive  Yes / No  Effective Date  File Name









 No information







Encounters







 Encounter  Practice  Location  Reason(s)  Diagnoses  Date  Provider  Providers



 Description      For Visit        Copied on



               Encounter

 

   [facility]  [facility]      Sep-0  Torie  



   [facility]  [place]      5  Karla.  



   [facility]        [address]  



   Finger          [place]  



   [place],  



   [address] [place]          [place],  



   , [place],          [place],  



   [place],          [address].  



   [phone],          tel:+  



   [place]          [phone]  



   tel:+[phone]            



   [phone]            

 

   [facility]anned  [facility]    Encounter for    White  Referring



   [facility]  [place]    surveillance of    Jy[place].  Provider:



   [facility]      injectable  [address]  Jy[place]



   Finger      contraceptive    [place]  [address], [address]



   [address],  [address] [place]



   [address] [place]          [place],  [address],



   [address], [place],



   [place],          [address],  [place],



   [id number],          [place].  [address].Cons



   [place]            ulting



   tel:+            Provider:



   [id number]            NURSE OR



               MA [facility].

 

   [facility]anned  [facility]    Carcinoma in situ  May-0  Torie  



   [facility]    [facility],    Karla.  



   [facility]      unspecifiedAnogenit  9  [address]  



   Finger      al (venereal) warts    [place]  



   [place],  



   [address] [place]          [place],  



   St, [place]  



   [place],          [address].  



   [id number],          tel:+60  



   [place]          [phone]  



   tel:+72            



   [phone]            

 

   [facility]  [facility]    High grade  May-0  [place]  Referring



   [facility]    intrepith lesion    Karla.  Provider:



   [facility]      cyto smr crvx  [address]  Karla



   Finger      (HGSIL)Cervical    [place]  Torie R,



   [place], [address]      high risk HPV DNA    [address],  [address]



   [address] [place]      test    [place],  [address],



   [address], [place],      positivePersonal    [place],



   [place],      history of [place]    [address].  [place], [address].



   [id number],      dysplasia    tel:+60  tel:+607



   [place]          [id number]  [id number]



   tel:+72            



   [phone]            

 

   [facility]  [facility]    Human  Apr-  White  Referring



   [facility]    immunodeficiency    [place].  Provider:



   [facility]      virus [HIV]  [address]  Jy[place]



   Finger      counselingEncounter    [place]  [place], [address]



   [address]      for screening for    [address],  [address] [place]



   [address] [place]      human    [place],  St,



   St, [place],      immunodeficiency    [place],



   [place],      virusEncntr for gyn    [address],  [place], [address].



   [id number],      exam (general)    [place].  



   [place]      (routine) w/o abn      



   tel:+72      findingsEncounter      



   [id number]      for oth screening      



         for malignant      



         neoplasm of      



         breastEncntr screen      



         for infections w      



         sexl mode of      



         transmissEncounter      



         for initial      



         prescription of      



         injectable      



         contracep      

 

   [facility]  [facility]    Encounter for  Mar-0  [place]  



   [facility]  [place]    initial  2-201  Simran  



   [facility]      prescription of  7  . [address]  



   Finger      contraceptive pills    [place]  



   [place],  



   [address] [place]          [place],  



   St, [place],          [place]  



   [place],          [address],  



   [phone],          [place].  



   [place]          tel:+  



   tel:          [phone]  



   [phone]            

 

   [facility]anned  [facility]    Encounter for  Dec-0  [place]  Referring



   [facility]  [place]    screening for  6-201  [place]  Provider:



   [facility]      malignant neoplasm  6  . [address]  Karla



   [place]      of cervixEncntr for    [place]  [place]ie RSavage, [address]      gyn exam (general)    [address],  [address]



   [address] [place]      (routine) w/o abn    [place],  [place] [address],



   [address], [place],      findingsEncounter    [place],



   [place],      for oth screening    [address],  [place], [address].



   [id number],      for malignant    [place].  tel:



   US      neoplasm of    tel:  [phone]



   tel:[phone]      breastEncounter for    [id number]  



   [id number]      surveillance of      



         injectable      



         contraceptiveEncntr      



         screen for      



         infections w sexl      



         mode of transmiss      

 

   Planned  [facility]    Encounter for  Sep-0  [place]  Referring



   [facility]  [place]    initial  8-201  [place].  Provider:



   [facility]      prescription of  [address]  Karla



   Finger      injectable    [place]  Torie RSavage, [address]      contracepTobacco    [address]



   [address] [place]      use    [place],  [address],



   St, [place],          [place]



   [place],          [address],  [place], [address].



   [id number],          [place].  tel:+



   [place]          tel:  [phone]



   tel:+          [phone]  



   [phone]            







Family History







 Family Member  Diagnosis  Age At Onset

 

 1st degree relative  No hx of venous thromboembolism  

 

 Father  No history of Stroke before age 55  

 

 1st degree relative  No hx of osteoporosis  

 

 Father  No history of Myocardial infarction before age 55  

 

 1st degree relative  No hx of cancer of breast, colon, endometrium or  



   ovary  

 

 Mother  Myocardial infarction  45

 

 Mother  No history of Stroke before age 65  







Immunizations







 Vaccine  Date  Status  Comments









 No information







Payers







 Payer name  Insurance type  Covered party ID  Authorization(s)

 

 Oh JAMES [facility]  CI  [id number]  







Social History







 Type  Description  Quantity  Date Captured  Comments

 

 Alcohol Use Details  Unknown    Sep-  

 

 Caffeine Use Details  Unknown    Sep-  

 

 Tobacco Use Status      Sep-  

 

 Smoking Status  Heavy tobacco smoker    Sep-  

 

 Birth Sex  Female      







Vital Signs







 Date /  Height  Weight  BMI  Pulse  Blood  Temperature  Respiratory  Body  
Head  BMI  Pulse  Inhaled



 Time:        Rate  Pressure    Rate  Surface  Circumference  percentile  Ox  Ox



                 Area        









 No information







Chief Complaint And Reason For Visit

No information



Reason For Referral







 Reason For Referral

 

 No information







Plan Of Treatment







 Date  Type  Action  Status

 

 May-  Goal  Tobacco cessation counseling  completed

 

 Mar-  Goal  Tobacco cessation counseling  completed

 

 Dec-  Goal  Tobacco cessation counseling  completed

 

 Sep-  Goal  Tobacco cessation counseling  completed

 

 Sep-  Goal  Tobacco cessation counseling  completed

 

 May-  Referral   Ordered:  ordered



     Gynecologist (related to Carcinoma in situ of cervix, unspecified)  

 

 May-  Referral   Ordered:  ordered



     Referrals: Gynecologist. Follow-up and treat  







History Of Present Illness







 Encounter Date  Complaint  History Of Present Illness









 No information







Functional Status







 Date  Functional Assessment









 No information







Medications Administered







 Medication  Instructions  Dosage  Effective Dates (start - stop)  Status  
Comments









 No information







Instructions







 Date  Instruction  Additional Information









 No information







Assessments







 Type  Assessment  Date









 No information







Goals







 Health Concern  Goal  Type  Priority  Status  Date









 No information







Medical Equipment







 Description  Device  Universal Device Identifier  Effective Dates (start - stop
)  Status









 No information







Mental Status







 Date  Cognitive Assessment









 No information







Health Concerns







 Observation  Date









 No information









 Concern  Status  Date









 No information

## 2019-10-01 NOTE — XMS REPORT
Continuity of Care Document (C-CDA R2.1) (Encounter date: 2019 11:32 AM)

 Created on:2019



Patient:YRN

Sex:Female

:1990

External Reference #:[id number]





Demographics







 Address  [address] [place] [address]

 

 Work Phone  [phone]

 

 Mobile Phone  [phone]

 

 Home Phone  [phone]

 

 Email Address  [email or web address]

 

 Preferred Language  und

 

 Marital Status  Not  or 

 

 Yazidi Affiliation  Unknown

 

 Race  Unknown

 

 Additional Race(s)  Other Race

 

 Ethnic Group  Not  or 









Author







 Organization  [facility]

 

 Address  [address] [place] [address]

 

 Phone  [phone]









Support







 Name  Relationship  Address  Phone

 

 KORINA POOL  Unavailable  Unavailable  [phone]









Care Team Providers







 Name  Role  Phone

 

 Onelia Hoff  Unavailable  Unavailable









Allergies, Adverse Reactions, Alerts







 Substance  Reaction  Status

 

 No Known Allergies    Active







Medications







 Medication  Instructions  Dosage  Effective Dates  Status  Comments



       (start - stop)    

 

 Depo-Provera 150  IM Q 11-13 weeks     -  Active  



 mg/mL intramuscular      May-    



 suspension          

 

 ZITHROMAX (unknown    Not Available   -  Active  



 strength)          

 

 ALBUTEROL INHALER    Not Available   -  Active  



 (unknown strength)          







Problems







 Condition  Effective Dates  Clinical Status  Comments



   (start - stop)    

 

 Encounter for surveillance      



 of injectable contraceptive      

 

 Carcinoma in situ of cervix,      



 unspecified      

 

 Anogenital (venereal) warts      

 

 High grade intrepith lesion      



 cyto smr crvx (HGSIL)      

 

 Cervical high risk HPV DNA      



 test positive      

 

 Personal history of cervical      



 dysplasia      

 

 Human immunodeficiency virus   -    



 [HIV] counseling      

 

 Encounter for screening for   -    



 human immunodeficiency virus      

 

 Encntr for gyn exam      



 (general) (routine) w/o abn      



 findings      

 

 Encounter for oth screening      



 for malignant neoplasm of      



 breast      

 

 Encntr screen for infections      



 w sexl mode of transmiss      

 

 Encounter for initial      



 prescription of injectable      



 contracep      

 

 Encounter for initial      



 prescription of      



 contraceptive pills      

 

 Encounter for screening for      



 malignant neoplasm of cervix      

 

 Encntr for gyn exam      



 (general) (routine) w/o abn      



 findings      

 

 Encounter for oth screening      



 for malignant neoplasm of      



 breast      

 

 Encounter for surveillance      



 of injectable contraceptive      

 

 Encntr screen for infections      



 w sexl mode of transmiss      

 

 Encounter for initial      



 prescription of injectable      



 contracep      

 

 Tobacco use      

 

 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



 cervix      







Procedures







 Procedure  Date









 No information







Results







 Test Name  Date and Time  Measure  Units  Reference Range  Abnormal Flag  
Status  Comments









 No information







Advance Directives







 Directive  Yes / No  Effective Date  File Name









 No information







Encounters







 Encounter  Practice  Location  Reason(s)  Diagnoses  Date  Provider  Providers



 Description      For Visit        Copied on



               Encounter

 

   [facility]  [facility]      Sep-0  [place]  



   [facility]  [place]      3  [place].  



   [facility]        [address]  



   Finger          [place]  



   [place]  



   [address] [place]          [place],  



   [address], [place],          [place]  



   [place],          [address],  



   [id number],          [place].  



   [place]          tel:+  



   tel:          [phone]  



   [phone]            

 

   [facility]anned  [facility]    Encounter for    [place]  Referring



   [facility]  [place]    surveillance of    [place].  Provider:



   [facility]      injectable  [address]  [address]  [place]



   Finger      contraceptive    [place]  [address], [address]



   [place],  [address] [place]



   [address] [place]          [place],  [address],



   [address], [place]          [place],



   [place],          [address],  [place],



   [id number],          [place].  [address].Cons



   [place]            ulting



   tel:+            Provider:



   [id number]            NURSE OR



               MA [facility].

 

   [facility]anned  [facility]    Carcinoma in situ  May-0  Torie  



   [facility]  [place]    of cervix,    Karla.  



   [facility]      unspecifiedAnogenit  9  [address]  



   Finger      al (venereal) warts    [place]  



   [place]          [address],  



   W [place]          [place],  



   St, [place],          [place],  



   [place],          [address].  



   [id number],          tel:+60  



   [place]          [phone]  



   tel:+72            



   [phone]            

 

   [facility]anned  [facility]    High grade  May-0  [place]ie  Referring



   [facility]    intrep[facility]    Karla.  Provider:



   [facility]      cyto smr crvx  [address]  [address]  Karla



   Finger      (HGSIL)Cervical    [place]  Tor[place] R,



   [place]      high risk HPV DNA    [address]



   W [place]      test    [place],  [place] [address],



   [address], [place],      [place]Personal    [place],



   [place],      history of [place]    [address].  [place], [address].



   [id number],      dysplasia    tel:+60  tel:+607



   [place]          [id number]  [id number]



   tel:+[phone]            



   [id number]            

 

   [facility]anned  [facility]    Human  Apr-  [place]  Referring



   [facility]  [place]    immunodeficiency    [place].  Provider:



   [facility]      virus [HIV]  [address]  [address]  [place]



   Finger      counselingEncounter    [place]  [place], [address]



   [place]      for screening for    [address],  W [place]



   W [place]      human    [place],  St,



   St, [place],      immunodeficiency    [place],



   [place],      virusEncntr for gyn    [address],  [place], [address].



   [id number],      exam (general)    [place].  



   [place]      (routine) w/o abn      



   tel:+72      findingsEncounter      



   [id number]      for oth screening      



         for malignant      



         neoplasm of      



         breastEncntr screen      



         for infections w      



         sexl mode of      



         transmissEncounter      



         for initial      



         prescription of      



         injectable      



         contracep      

 

   [facility]anned  [facility]    Encounter for  Mar-0  [place]  



   [facility]  [place]    initial  2-201  Simran  



   [facility]      prescription of  7  . [address]  



   Finger      contraceptive pills    [place]  



   [place],  



   W [place]          [place],  



   St, [place],          [place]  



   [place],          [address],  



   [id number],          [place].  



   [place]          tel:+  



   tel:          [phone]  



   [phone]            

 

   [facility]anned  [facility]    Encounter for  Dec-0  [place]  Referring



   [facility]  [place]    screening for  6-201  [place]ran  Provider:



   [facility]      malignant neoplasm  6  . [address]  Karla



   [place]      of cervixEncntr for    [place]  [place] RSavage, [address]      gyn exam (general)    [address],  [address]



   W [place]      (routine) w/o abn    [place],  [place] [place],



   St, [place],      findingsEncounter    [place],



   [place],      for oth screening    [address],  [place], [address].



   [id number],      for malignant    [place].  tel:



   US      neoplasm of    tel:  [phone]



   tel:[phone]      breastEncounter for    [id number]  



   820231      surveillance of      



         injectable      



         contraceptiveEncntr      



         screen for      



         infections w sexl      



         mode of transmiss      

 

   [facility]anned  [facility]    Encounter for  Sep-0  [place]  Referring



   [facility]  [place]    initial  8-201  [place].  Provider:



   [facility]      prescription of  6  [address]  Karla



   Finger      injectable    [place]  Tor[place] RSavage, [address]      contracepTobacco    [address],  [address]



   [address] [place]      use    [place],  [place],



   St, [place],          [place],



   [place],          [address],  [place], [address].



   [id number],          [place].  tel:



   [place]          tel:  [phone]



   tel:+          [phone]  



   [id number]            







Family History







 Family Member  Diagnosis  Age At Onset

 

 1st degree relative  No hx of venous thromboembolism  

 

 Father  No history of Stroke before age 55  

 

 1st degree relative  No hx of osteoporosis  

 

 Father  No history of Myocardial infarction before age 55  

 

 1st degree relative  No hx of cancer of breast, colon, endometrium or  



   ovary  

 

 Mother  Myocardial infarction  45

 

 Mother  No history of Stroke before age 65  







Immunizations







 Vaccine  Date  Status  Comments









 No information







Payers







 Payer name  Insurance type  Covered party ID  Authorization(s)

 

 Oh JAMES [facility]  [id number]  







Social History







 Type  Description  Quantity  Date Captured  Comments

 

 Alcohol Use Details  Unknown    Sep-  

 

 Caffeine Use Details  Unknown    Sep-  

 

 Tobacco Use Status      Sep-  

 

 Smoking Status  Heavy tobacco smoker    Sep-  

 

 Birth Sex  Female      







Vital Signs







 Date /  Height  Weight  BMI  Pulse  Blood  Temperature  Respiratory  Body  
Head  BMI  Pulse  Inhaled



 Time:        Rate  Pressure    Rate  Surface  Circumference  percentile  Ox  Ox



                 Area        









 No information







Chief Complaint And Reason For Visit

No information



Reason For Referral







 Reason For Referral

 

 No information







Plan Of Treatment







 Date  Type  Action  Status

 

 May-  Goal  Tobacco cessation counseling  completed

 

 Mar-  Goal  Tobacco cessation counseling  completed

 

 Dec-  Goal  Tobacco cessation counseling  completed

 

 Sep-  Goal  Tobacco cessation counseling  completed

 

 Sep-  Goal  Tobacco cessation counseling  completed

 

 May-  Referral   Ordered:  ordered



     Gynecologist (related to Carcinoma in situ of cervix, unspecified)  

 

 May-  Referral   Ordered:  ordered



     Referrals: Gynecologist. Follow-up and treat  







History Of Present Illness







 Encounter Date  Complaint  History Of Present Illness









 No information







Functional Status







 Date  Functional Assessment









 No information







Medications Administered







 Medication  Instructions  Dosage  Effective Dates (start - stop)  Status  
Comments









 No information







Instructions







 Date  Instruction  Additional Information









 No information







Assessments







 Type  Assessment  Date









 No information







Goals







 Health Concern  Goal  Type  Priority  Status  Date









 No information







Medical Equipment







 Description  Device  Universal Device Identifier  Effective Dates (start - stop
)  Status









 No information







Mental Status







 Date  Cognitive Assessment









 No information







Health Concerns







 Observation  Date









 No information









 Concern  Status  Date









 No information

## 2019-10-01 NOTE — UC
Complaint Female HPI





- HPI Summary


HPI Summary: 





28 yo female presents with left back pain and heart burn. 


1) Over the last 3-4 days has been moving her brother in law's belongings into 

storage and pulled a muscle in her left lower/mid back. She has been taking 

ibuprofen which helps a little. Pain is worse with movement. She is feeling 

better with this today. Denies radiation of pain, numbness, tingling, urinary 

symptoms, saddle anesthesia, or loss of bowel/bladder control.





2) She tells me that she has a history of GERD and takes zantac for this. Over 

the past 2 weeks has noticed that at bedtime her symptoms are worse consisting 

of burning in her throat and chest when lying flat. Symptoms are relieved by 

sitting upright, thus she has been sleeping in a recliner more frequently. She 

vomited once this morning, but otherwise has not. Eating and drinking well. 





Denies fever, SOB, chest pain, abdominal pain, dysuria.





- History Of Current Complaint


Chief Complaint: UCGU


Stated Complaint: BACK PAIN, VOMITING, AND FEVER


Time Seen by Provider: 10/01/19 10:48


Hx Obtained From: Patient


Hx Last Menstrual Period: Depo


Onset/Duration: Gradual Onset


Severity Initially: Moderate


Severity Currently: Moderate


Pain Intensity: 6


Pain Scale Used: 0-10 Numeric





- Allergies/Home Medications


Allergies/Adverse Reactions: 


 Allergies











Allergy/AdvReac Type Severity Reaction Status Date / Time


 


No Known Allergies Allergy   Verified 10/01/19 10:39














PMH/Surg Hx/FS Hx/Imm Hx





- Additional Past Medical History


Additional PMH: 





GERD


Other History Of: Hepatitis C





- Surgical History


Surgical History: None





- Family History


Known Family History: Positive: Cardiac Disease





- Social History


Lives: With Family


Alcohol Use: Weekly


Substance Use Type: None


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


Smoking Status (MU): Heavy Every Day Tobacco Smoker


Type: Cigarettes





Review of Systems


All Other Systems Reviewed And Are Negative: No


Constitutional: Positive: Negative


Skin: Positive: Negative


Eyes: Positive: Negative


ENT: Positive: Negative


Respiratory: Positive: Negative


Cardiovascular: Positive: Negative


Gastrointestinal: Positive: Nausea


Genitourinary: Positive: Negative


Motor: Positive: Negative


Neurovascular: Positive: Negative


Musculoskeletal: Positive: Other: - Back pain


Neurological: Positive: Negative


Psychological: Positive: Negative





Physical Exam





- Summary


Physical Exam Summary: 





GENERAL: NAD. WDWN. No pain distress.


SKIN: No rashes, sores, lesions, or open wounds.


NECK: Supple. Nontender. No lymphadenopathy. 


CHEST:  CTAB. No r/r/w. No accessory muscle use. Breathing comfortably and in 

no distress.


CV:  RRR. Without m/r/g. Pulses intact. Cap refill <2seconds


ABDOMEN:  Soft. NTTP. No distention or guarding. No CVA tenderness. Bowel 

sounds present


MSK: TTP over LEFT lumbar paraspinal muscles. Pain with flexion and extension 

of spine and truncal twisting. Positive SLR on LEFT for low back pain without 

radiation. Strength 5/5 B/L LEs including dorsiflexion and plantar flexion. 

FROM B/L LEs. No edema. 


NEURO: Alert. Sensations intact B/L LEs L3-S1. Reflexes intact


PSYCH: Age appropriate behavior.


Triage Information Reviewed: Yes


Vital Signs: 


 Initial Vital Signs











Temp  98 F   10/01/19 10:40


 


Pulse  84   10/01/19 10:40


 


Resp  16   10/01/19 10:40


 


BP  118/91   10/01/19 10:40


 


Pulse Ox  99   10/01/19 10:40








 Laboratory Tests











  10/01/19 10/01/19





  11:14 11:18


 


POC Urine Color  Yellow 


 


POC Urine Clarity  Clear 


 


POC Urine pH  6.0 


 


POC Ur Specif Gravity  <= 1.005 L 


 


POC Urine Protein  Negative 


 


POC Ur Glucose (UA)  Negative 


 


POC Urine Ketones  Negative 


 


POC Urine Blood  1+ A 


 


POC Urine Nitrite  Negative 


 


POC Urine Bilirubin  Negative 


 


POC Urine Urobilinogen  0.2 


 


POC U Leukocyte Esteras  Trace A 


 


POC Ur Pregnancy Test   Negative











Vital Signs Reviewed: Yes





Re-Evaluation





- Re-Evaluation


  ** First Eval


Re-Evaluation Time: 11:54


Change: Improved


Comment: No more nausea or epigastric burning s/p GI cocktail





 Complaint Female Dx





- Course


Course Of Treatment: 





In the clinic she was given toradol IM for her suspect MSK related left lower 

back pain. For her GERD symptoms she was given maalox, viscous lidocaine, and 

pepcid and had resolution of her nausea and epigastric/throat burning sensation 

- reported feeling a lot better. 





Back strain - advised to rest, heat, and continue tylenol/ibuprofen for her 

discomfort. F/u with PCP if symptoms do not improve within 5 days





GERD - previously well controlled with zantac, likely exacerbated recently by 

increased ibuprofen use due to back pain. She had great relief with GI cocktail 

in the clinic, thus will rx for pepcid and have her f/u with her PCP for 

further evaluation if symptoms continue. 





Her UA did have trace leuks today, but she is afebrile, has not CVA or flank 

pain, and has no urinary symptoms - thus will send urine for culture and treat 

if needed. Suspicion for UTI is low





- Differential Dx/Diagnosis


Provider Diagnosis: 


 Back strain, GERD (gastroesophageal reflux disease)








Discharge ED





- Sign-Out/Discharge


Documenting (check all that apply): Patient Departure


All imaging exams completed and their final reports reviewed: No Studies





- Discharge Plan


Condition: Stable


Disposition: HOME


Prescriptions: 


Famotidine TAB* [Pepcid 20 MG TAB*] 20 mg PO DAILY #14 tab


Ondansetron ODT TAB* [Zofran 4 MG Odt TAB*] 4 mg PO Q8H PRN #10 tab.odt


 PRN Reason: Nausea


Patient Education Materials:  Gastroesophageal Reflux Disease (DC), Back Pain (

ED)


Forms:  *Work Release


Referrals: 


No Primary Care Phys,NOPCP [Primary Care Provider] - 


Additional Instructions: 


If you develop a fever, shortness of breath, chest pain, new or worsening 

symptoms - please call your PCP or go to the ED immediately.


 


1) Rest and apply heat to your back to reduce pain. May continue tylenol/

ibuprofen as directed for discomfort





2) Please take the pepcid daily for at least 7 days to help your acid reflux





3) Your urine today will be sent to the lab for further testing and we will 

treat you with antibiotics if needed - you did not have any urinary symptoms 

today, therefore this is unlikely an infection.





- Billing Disposition and Condition


Condition: STABLE


Disposition: Home





- Attestation Statements


Provider Attestation: 





Patient not seen by me


I was available for consult


Chart reviewed


rolando

## 2019-10-01 NOTE — XMS REPORT
Continuity of Care Document (C-CDA R2.1) (Encounter date: 2019 10:10 AM)

 Created on:2019



Patient:YRN

Sex:Female

:1990

External Reference #:[id number]





Demographics







 Address  [address] [address]

 

 Work Phone  [phone]

 

 Mobile Phone  [phone]

 

 Home Phone  [phone]

 

 Email Address  [email or web address]

 

 Preferred Language  und

 

 Marital Status  Not  or 

 

 Holiness Affiliation  Unknown

 

 Race  Unknown

 

 Additional Race(s)  Other Race

 

 Ethnic Group  Not  or 









Author







 Organization  [facility]

 

 Address  [address] [place] [address]

 

 Phone  [phone]









Support







 Name  Relationship  Address  Phone

 

 KORINA POOL  Unavailable  Unavailable  [phone]









Care Team Providers







 Name  Role  Phone

 

 Doretha Boston  Unavailable  Unavailable









Allergies, Adverse Reactions, Alerts







 Substance  Reaction  Status

 

 No Known Allergies    Active







Medications







 Medication  Instructions  Dosage  Effective Dates  Status  Comments



       (start - stop)    

 

 Depo-Provera 150  IM Q 11-13 weeks     -  Active  



 mg/mL intramuscular      May-    



 suspension          

 

 ZITHROMAX (unknown    Not Available   -  Active  



 strength)          

 

 ALBUTEROL INHALER    Not Available   -  Active  



 (unknown strength)          







Problems







 Condition  Effective Dates  Clinical Status  Comments



   (start - stop)    

 

 Encounter for surveillance      



 of injectable contraceptive      

 

 Human immunodeficiency virus  Sep- -    



 [HIV] counseling      

 

 Encounter for surveillance      



 of injectable contraceptive      

 

 Carcinoma in situ of cervix,      



 unspecified      

 

 Anogenital (venereal) warts      

 

 High grade intrepith lesion      



 cyto smr crvx (HGSIL)      

 

 Cervical high risk HPV DNA      



 test positive      

 

 Personal history of cervical      



 dysplasia      

 

 Human immunodeficiency virus   -    



 [HIV] counseling      

 

 Encounter for screening for   -    



 human immunodeficiency virus      

 

 Encntr for gyn exam      



 (general) (routine) w/o abn      



 findings      

 

 Encounter for oth screening      



 for malignant neoplasm of      



 breast      

 

 Encntr screen for infections      



 w sexl mode of transmiss      

 

 Encounter for initial      



 prescription of injectable      



 contracep      

 

 Encounter for initial      



 prescription of      



 contraceptive pills      

 

 Encounter for screening for      



 malignant neoplasm of cervix      

 

 Encntr for gyn exam      



 (general) (routine) w/o abn      



 findings      

 

 Encounter for oth screening      



 for malignant neoplasm of      



 breast      

 

 Encounter for surveillance      



 of injectable contraceptive      

 

 Encntr screen for infections      



 w sexl mode of transmiss      

 

 Encounter for initial      



 prescription of injectable      



 contracep      

 

 Tobacco use      

 

 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



 cervix      







Procedures







 Procedure  Date

 

 INJECTION DEPO/CEFTRIAXONE  Sep-

 

 INJECTION OR LAB ONLY VISIT EST  Sep-

 

 PREVENTIVE COUNSELING, Under 8 Minutes  Sep-

 

 BLOOD PRESSURE  Sep-

 

 Height/Weight  Sep-

 

 OTHER Medical Services  Sep-

 

 Contraceptive  Sep-

 

 Cns.Svc. Other  Sep-

 

 Cns.Svc. STI  Sep-

 

 DEPO  Sep-







Results







 Test Name  Date and Time  Measure  Units  Reference Range  Abnormal Flag  
Status  Comments









 No information







Advance Directives







 Directive  Yes / No  Effective Date  File Name









 No information







Encounters







 Encounter  Practice  Location  Reason(s)  Diagnoses  Date  Provider  Providers



 Description      For Visit        Copied on



               Encounter

 

   [facility]anned  [facility]  Depo Nurse  [facility]  [facility]  [facility]  Referring



   [facility]  [place]  Visit  surveillance of    Doretha.  Provider:



   [facility]    (chief  injectable  [address]  Doretha



   Finger    complaint)  contraceptiveHuman    [address]



   [address]      immunodeficiency    [address],  [address]



   [address]      virus [HIV]    [facility],



   , [place],      [place]ing    [place],          [address].  [place], [address].



   [id number],          tel:+  tel:+[phone]



   [place]          [phone]  [id number]



   tel:+[phone]            



   [phone]            

 

   Planned  [facility]    Encounter for    White  Referring



   [facility]    surve[facility]    [place].  Provider:



   [facility]      injectable  [address]  [place]



   Finger      contraceptive    [address]



   [address],  [address]



   [address]          [place],  ,



   , [place]



   [place],          [address],  [place],



   [id number],          [place].  [address].Cons



   [place]            ulting



   tel:[phone]            Provider:



   [id number]            NURSE OR



               [place] [facility].

 

   Planned  [facility]    Carcinoma in situ  May-  Torie  



   [facility]  [place]    of cervix,    Karla.  



   [facility]      unspecifiedAnogenit  [address]  



   Finger      al (venereal) warts    [address]  



   [address],  



   [address]          [place],  



   [address], [place]  



   [place],          [address].  



   [phone],          tel:[phone]  



   [place]          [phone]  



   tel:[phone]            



   [phone]            

 

   Planned  [facility]    High grade  May-0  Torie  Referring



   [facility]  [place]    intrepith lesion  2-201  Karla.  Provider:



   Yeyo      cyto smr crvx  [address]  [place]la



   Finger      (HGSIL)Cervical    [address]  [address]      high risk HPV DNA    [address]



   [address]      test    [place],  [address],



   [address], [place],      positivePersonal    [place],



   [place]      history of cervical    [address].  [place], [address].



   [id number],      dysplasia    tel:  tel:



   [place]          [phone]  [id number]



   tel:            



   [phone]            

 

   Planned  [facility]    Human  Apr-  White  Referring



   [facility]  [place]    immunodeficiency  8-201  Joy[place].  Provider:



   Yeyo      virus [HIV]  [address]  Jyoti



   Finger      counselingEncounter    [address]  [place], [address]



   [address]      for screening for    [address],  [address]



   [address]      human    [place],  [address],



   [address], [place],      immunodeficiency    [place],



   [place],      virusEncntr for gyn    [address],  [place], [address].



   [id number],      exam (general)    [place].  



   [place]      (routine) w/o abn      



   tel:      findingsEncounter      



   [phone]      for oth screening      



         for malignant      



         neoplasm of      



         breastEncntr screen      



         for infections w      



         sexl mode of      



         transmissEncounter      



         for initial      



         prescription of      



         injectable      



         contracep      

 

   Planned  [facility]    Encounter for  Mar-0  Guggino  



   [facility]  [place]    initial  2-201  Simran  



   [facility]      prescription of  [address]  . [address]  



   F[place]      contraceptive pills    [address]  



   [address],  



   [address]          [place],  



   [address], [place],          [place]  



   [place],          [address],  



   [id number],          [place].  



   [place]          tel:  



   tel:          [phone]  



   [phone]            

 

   Planned  [facility]    Encounter for  Dec-0  Guggino  Referring



   [facility]  [place]    screening for  6-201  Simran  Provider:



   Yeyo      malignant neoplasm  6  . [address]  Karla



   [place]      of cervixEncntr for    Unga  [address], [address]      gyn exam (general)    [address],  [address]



   [address]      (routine) w/o abn    [place],  [address],



   [address], [place],      findingsEncounter    [place],



   [place],      for oth screening    [address],  [place], [address].



   [id number],      for malignant    [place].  tel:



   [place]      neoplasm of    tel:  [phone]



   tel:[phone]      breastEncounter for    [id number]  



   [id number]      surveillance of      



         injectable      



         contraceptiveEncntr      



         screen for      



         infections w sexl      



         mode of transmiss      

 

   Planned  [facility]    Encounter for  Sep-  Angella  Referring



   [facility]  [place]    initial  8-201  [place].  Provider:



   [facility]      prescription of  [address]  [place]



   [place]      injectable    [address]  Torie JASSO



   [place]      contracepTobacco    [address],  [address]



   [address]      use    [place],  [address],



   [address], [place],          [place],          [address],  [place], [address].



   [id number],          [place].  tel:



             tel:  [phone]



   tel:[phone]          [phone]  



   [id number]            







Family History







 Family Member  Diagnosis  Age At Onset

 

 1st degree relative  No hx of venous thromboembolism  

 

 Father  No history of Stroke before age 55  

 

 1st degree relative  No hx of osteoporosis  

 

 Father  No history of Myocardial infarction before age 55  

 

 1st degree relative  No hx of cancer of breast, colon, endometrium or  



   ovary  

 

 Mother  Myocardial infarction  45

 

 Mother  No history of Stroke before age 65  







Immunizations







 Vaccine  Date  Status  Comments









 No information







Payers







 Payer name  Insurance type  Covered party ID  Authorization(s)

 

 Oh JAMES [facility]  [employer]  [id number]  







Social History







 Type  Description  Quantity  Date Captured  Comments

 

 Alcohol Use Details  Unknown    Sep-  

 

 Caffeine Use  Unknown    Sep-  



 Details        

 

 Tobacco Use Status      Sep-  

 

 Smoking Status  Heavy tobacco smoker    Sep-  

 

 Smoking Tobacco Use  Cigarette: No Details Available  Cigarette: 10 Cigarettes 
per day  Sep-  



 Details        

 

 Birth Sex  Female      







Vital Signs







 Date /  Height  Weight  BMI  Pulse  Blood  Temperature  Respiratory  Body  
Head  BMI  Pulse  Inhaled



 Time:        Rate  Pressure    Rate  Surface  Circumference  percentile  Ox  Ox



                 Area        









 No information







Chief Complaint And Reason For Visit

*** Most recent encounter only, dated '2019 10:10'. ***Depo Nurse Visit (
chief complaint)



Reason For Referral







 Reason For Referral

 

 No information







Plan Of Treatment







 Date  Type  Action  Status

 

 May-  Goal  Tobacco cessation counseling  completed

 

 Mar-  Goal  Tobacco cessation counseling  completed

 

 Dec-  Goal  Tobacco cessation counseling  completed

 

 Sep-  Goal  Tobacco cessation counseling  completed

 

 Sep-  Goal  Tobacco cessation counseling  completed

 

 May-  Referral   Ordered:  ordered



     Gynecologist (related to Carcinoma in situ of cervix, unspecified)  

 

 May-  Referral   Ordered:  ordered



     Referrals: Gynecologist. Follow-up and treat  







History Of Present Illness







 Encounter Date  Complaint  History Of Present Illness









 No information







Functional Status







 Date  Functional Assessment









 No information







Medications Administered







 Medication  Instructions  Dosage  Effective Dates (start - stop)  Status  
Comments









 No information







Instructions







 Date  Instruction  Additional Information









 No information







Assessments







 Type  Assessment  Date

 

 assessment  Encounter for surveillance of injectable contraceptive  Sep-

 

 assessment  Human immunodeficiency virus [HIV] counseling  Sep-







Goals







 Health Concern  Goal  Type  Priority  Status  Date









 No information







Medical Equipment







 Description  Device  Universal Device Identifier  Effective Dates (start - stop
)  Status









 No information







Mental Status







 Date  Cognitive Assessment









 No information







Health Concerns







 Observation  Date









 No information









 Concern  Status  Date









 No information

## 2019-10-01 NOTE — XMS REPORT
Continuity of Care Document (CCD)

 Created on:2019



Patient:Polly Parham

Sex:Female

:1990

External Reference #:[id number]





Demographics







 Address  [address] [place] [address]

 

 Home Phone  [phone]

 

 Mobile Phone  [phone]

 

 Preferred Language  en

 

 Marital Status  Not  or 

 

 Sikh Affiliation  Unknown

 

 Race  White

 

 Ethnic Group  Not  or 









Author







 Name  Jaswinder Cabral MD (transmitted by agent of provider Faith Ray)

 

 Address  [address] [place] [address]









Problems







 Description

 

 No Information Available







Social History







 Type  Date  Description  Comments

 

 Birth Sex    Unknown  

 

 Tobacco Use  Start: Unknown  Heavy tobacco smoker  



     (more than 10  



     cigarettes/day)  

 

 ETOH Use    Currently consumes  3 drinks per week



     alcohol  

 

 Recreational Drug Use    Denies Drug Use  

 

 Tobacco Use  Start: Unknown  Patient is a current  



     smoker, smokes every  



     day  

 

 Smoking Status  Reviewed: 19  Patient is a current  



     smoker, smokes every  



     day  

 

 Exercise Type/Frequency    Does not exercise  







Allergies, Adverse Reactions, Alerts







 Description

 

 No Known Drug Allergies







Medications







 Active Medications  SIG  Qnty  Indications  Ordering  Date



         Provider  

 

 Depo-Provera  150mg intramuscular      Unknown  



             150mg/ml  every 3 months        



 Suspension          



           

 

 Tylenol  2 tablets every 4      Unknown  



        325mg  hours as needed for        



 Capsules  pain        



           







Immunizations







 Description

 

 No Information Available







Vital Signs







 Date  Vital  Result  Comment

 

 2019  4:00pm  Height  61 inches  5'1"









 Weight  140.12 lb  

 

 Heart Rate  93 /min  

 

 BP Systolic  140 mmHg  

 

 BP Diastolic  85 mmHg  

 

 O2 % BldC Oximetry  100 %  

 

 BMI (Body Mass Index)  26.5 kg/m2  







Results







 Description

 

 No Information Available







Procedures







 Description

 

 No Information Available







Medical Devices







 Description

 

 No Information Available







Encounters







 Type  Date  Location  Provider  Dx  Diagnosis

 

 Office Visit  2019  [facility]  Jaswinder Cabral MD  N87.1  Moderate 
cervical



   4:00p  [facility]      dysplasia







Assessments







 Date  Code  Description  Provider

 

 2019  N87.1  Moderate cervical dysplasia  Jaswinder Cabral MD







Plan of Treatment

2019 - Jaswinder Cabral MDN87.1 Moderate cervical dysplasiaFollow up:Our 
office will call you next week to schedule a Khan Cone biopsy of the cervix 
at [facility].



Functional Status







 Description

 

 No Information Available







Mental Status







 Description

 

 No Information Available







Referrals







 Description

 

 No Information Available

## 2019-11-22 NOTE — OP
CC:  [facility] *

 

DATE OF OPERATION:  11/22/19 - \[facility] [place] [facility]\""

 

DATE OF BIRTH:  [birth year]

 

SURGEON:  Jaswinder Cabral MD

 

ANESTHESIOLOGIST:  Dr. Samano.

 

ANESTHESIA:  Spinal anesthesia with sedation.

 

PRE-OP DIAGNOSES:

1.  Severe cervical dysplasia on colposcopy biopsy.

2.  Vulvar lesion.

 

POST-OP DIAGNOSES:

1.  Severe cervical dysplasia on colposcopy biopsy.

2.  Vulvar lesion.

 

OPERATIVE PROCEDURE:  Colposcopy, Khan cone biopsy size large, endocervical 
curettage, and vulvar biopsy.

 

ESTIMATED BLOOD LOSS:  Approximately 20 cc.

 

SPECIMENS:

1.  Cervical cone biopsy size large cut at 12 o'clock.

2.  Endocervical curettings.

3.  Vulvar biopsy.

 

FLUIDS:  Per Anesthesia.

 

DRAINS:  None.

 

FINDINGS:  There was an approximately 1 cm raised pigmented round lesion on the 
right perineum to the right of the midline between the introitus and the anus. 
This was removed in its entirety.  

On colposcopy, there were acetowhite changes surrounding the cervical os more 
on the right than the left, most prominent at 4 o'clock.  There was Lugol 
nonstaining in a similar distribution.

 

COMPLICATIONS:  None.

 

COUNTS:  Sponge, lap, and needle count correct x2.

 

CONDITION:  The patient was brought to recovery room awake and in stable 
condition.

 

DESCRIPTION OF PROCEDURE:  The patient was brought to the operating room.  When 
spinal anesthesia was found to be adequate, the patient was positioned in the 
dorsal lithotomy position.  Prep was not performed due to colposcopy.  A time-
out was performed.  Colposcopy was then performed with the above findings 
noted. Khan cone size large was performed and that specimen was sent to 
Pathology. Endocervical curettage was then performed and that was sent to 
Pathology. Excellent hemostasis was achieved using Monsel's solution and the 
roller-ball cautery.  Attention was then turned to the perineal lesion.  This 
was excised using the scalpel and 3 interrupted sutures of 4-0 Vicryl were used 
to achieve excellent hemostasis.  The patient tolerated the procedure well and 
was brought to the recovery room awake and in stable condition.

 

 [id number]/CPS #: [id number]

[facility]

## 2020-01-08 ENCOUNTER — HOSPITAL ENCOUNTER (EMERGENCY)
Dept: HOSPITAL 25 - UCEAST | Age: 30
Discharge: HOME | End: 2020-01-08
Payer: COMMERCIAL

## 2020-01-08 VITALS — SYSTOLIC BLOOD PRESSURE: 140 MMHG | DIASTOLIC BLOOD PRESSURE: 71 MMHG

## 2020-01-08 DIAGNOSIS — K52.9: Primary | ICD-10-CM

## 2020-01-08 DIAGNOSIS — F17.210: ICD-10-CM

## 2020-01-08 LAB
FLUAV RNA SPEC QL NAA+PROBE: NEGATIVE
FLUBV RNA SPEC QL NAA+PROBE: NEGATIVE

## 2020-01-08 PROCEDURE — 99212 OFFICE O/P EST SF 10 MIN: CPT

## 2020-01-08 PROCEDURE — G0463 HOSPITAL OUTPT CLINIC VISIT: HCPCS

## 2020-01-08 NOTE — XMS REPORT
Continuity of Care Document (CCD)

 Created on:2019



Patient:Ronaldo Parham

Sex:Female

:1990

External Reference #:[id number]





Demographics







 Address  [address] [place] [address]

 

 Home Phone  [phone]

 

 Mobile Phone  [phone]

 

 Preferred Language  en

 

 Marital Status  Not  or 

 

 Episcopal Affiliation  Unknown

 

 Race  White

 

 Ethnic Group  Not  or 









Author







 Name  Jaswinder Cabral MD (transmitted by agent of provider Annia Arriola)

 

 Address  [facility] [place] [address]









Problems







 Description

 

 No Information Available







Social History







 Type  Date  Description  Comments

 

 Birth Sex    Unknown  

 

 Tobacco Use  Start: Unknown  Heavy tobacco smoker  



     (more than 10  



     cigarettes/day)  

 

 ETOH Use    Currently consumes  3 drinks per week



     alcohol  

 

 Tobacco Use  Start: Unknown  Patient is a current  



     smoker, smokes every  



     day  

 

 Recreational Drug Use    Former Drug User  

 

 Smoking Status  Reviewed: 19  Patient is a current  



     smoker, smokes every  



     day  

 

 Exercise Type/Frequency    Does not exercise  







Allergies, Adverse Reactions, Alerts







 Description

 

 No Known Drug Allergies







Medications







 Active Medications  SIG  Qnty  Indications  Ordering  Date



         Provider  

 

 Clotrimazole/Betamet  apply to corners of  30gm    Jaswinder Cabral,  2019



 hasone Dipropionate  the mouth twice a      MD  



   day until irritation        



 1-0.05% Cream  resolves        



           

 

 Ibuprofen  one tablet by mouth  20tabs  N87.1  Jaswinder Cabral,  2019



          600mg  q6 as needed pain      MD  



 Tablets          



           

 

 Depo-Provera  150mg intramuscular      Unknown  



             150mg/ml  every 3 months        



 Suspension          



           

 

 Tylenol  2 tablets every 4      Unknown  



        325mg  hours as needed for        



 Capsules  pain        



           









 History Medications









 Oxycodone-Acetaminophen  one tablet by  8tabs  N87.1  Jaswinder Cabral,  2019 -



              5-325mg Tablets  mouth q6      MD  2019



   hours as        



   needed strong        



   pain        







Immunizations







 Description

 

 No Information Available







Vital Signs







 Date  Vital  Result  Comment

 

 2019  1:34pm  Height  61 inches  5'1"









 Weight  142.25 lb  

 

 Heart Rate  90 /min  

 

 BP Systolic  135 mmHg  

 

 BP Diastolic  84 mmHg  

 

 O2 % BldC Oximetry  99 %  

 

 BMI (Body Mass Index)  26.9 kg/m2  









 2019 10:58am  Height  61 inches  5'1"









 Weight  139.00 lb  

 

 Heart Rate  88 /min  

 

 BP Systolic  135 mmHg  

 

 BP Diastolic  80 mmHg  

 

 O2 % BldC Oximetry  99 %  

 

 BMI (Body Mass Index)  26.3 kg/m2  







Results







 Test  Acquired Date  Facility  Test  Result  H/L  Range  Note

 

 Surgical  2019  [facility]  Surgical  SEE RESULT      1



 Pathology    [address]  Pathology  BELOW      



     [place], [place] [id number] [phone]          









 [facility]  SEE IMAGE      









 1  SEE RESULT BELOW



   Name:  RONALDO PARHAM              : 1990    Attend Dr: Jaswinder Cabral MD



   Acct:  [id number]  Unit: [id number]  AGE: 29            Location:  OR



   Re19                        SEX: F             Status:    REG [facility]



   -----------------------------------------------------------------------------
---------------



   



   SPEC: [id number]            BERHANE:       SUBM DR: Jaswinder Cabral MD



   REQ:  [id number]             RECD: 19-



   STATUS: SOUT



   _



   ORDERED:  LEVEL 4/2, LEVEL 5



   



   FINAL DIAGNOSIS



   1.  Uterus, cervix, LEEP excision:



   --Focal high-grade squamous intraepithelial lesion (severe dysplasia, TIGIST III
).



   -- Inked and cauterized margins of resection are clear.



   2.   Uterus, endocervix, curettage:



   -- Benign endocervical glandular epithelium and mucus.



   -- No evidence of neoplasia or dysplasia identified.



   3.   Perineum, biopsy:



   -- Benign, predominantly intradermal melanocytic nevus, neurotized.



   -----------------------------------------------------------------------------
---------------



   Comment: Immunohistochemical stains for p16 (HPV surrogate



   marker) (was performed with appropriate controls on blocks 1A



   through C which support the above render diagnosis.



   



   



   PRE-OPERATIVE DIAGNOSIS



   Cervical dysplasia



   



   



   



   



   ** CONTINUED ON NEXT PAGE **



   



   DEPARTMENT OF PATHOLOGY,  [address] [place] [address]



   Phone # [phone]      Fax #[phone]



   Beau Nava M.D. Director     [facility] # [id number]



   



   



   



   



   



   



   GROSS DESCRIPTION



   1.   The specimen is received in formalin labeled, Cervical Biopsy with Cut 
at 12:00, and



   consists of a 2.1 x 1.5 cm tan-pink previously incised ovoid rubbery focally 
cauterized soft



   tissue fragment excised to a maximum depth of 1.9 cm.  The specimen is 
consistent with a



   cervical LEEP biopsy; the incision designates 12:00.  The ectocervix is 
smooth to wrinkled



   tan-pink and the endocervix is mucinous tan-red.  The specimen is inked as 
follows:



   ectocervical margin-black and endocervical margin-blue, serially sectioned 
in a clockwise



   radial fashion beginning at 12:00 and entirely submitted in four cassettes.



   2.   The specimen is received in formalin labeled, Endocervical Curettage, 
and consists of a



   0.7 x 0.6 x 0.1 cm aggregate of tan-red irregular soft tissue fragments and 
blood-tinged



   mucus.  Entirely submitted, one cassette.



   3.   The specimen is received in formalin labeled, Perineal Biopsy, and 
consists of a 0.6 x



   0.5 x 0.3 cm tan ovoid hairbearing skin fragment which is inked, bisected 
and submitted



   entirely in one cassette.



   



   Signed by and Reported on: __________              Beau Nava MD  [id number]



   



   



   -----------------------------------------------------------------------------
---------------



   



   



   



   



   



   



   



   



   



   



   



   



   



   



   



   



   



   



   



   



   



   



   ** END OF REPORT **



   



   



   DEPARTMENT OF PATHOLOGY,  [address] [place] [address]



   Phone # [phone]      Fax #[phone]



   Beau Nava M.D. Director     [facility] # [id number]







Procedures







 Date  Code  Description  Status

 

 2019  87774  Colposcopy,W/Loop Electrode Conization Of The Cervix  
Completed

 

 2019  65[address]  Colposcopy,With Biopsy  Completed







Medical Devices







 Description

 

 No Information Available







Encounters







 Type  Date  Location  Provider  Dx  Diagnosis

 

 Office Visit  2019  [facility]  Jaswinder Cabral MD  N87.1  Moderate 
cervical



   4:00p  [facility]inic [facility]      dysplasia







Assessments







 Date  Code  Description  Provider

 

 2019  N87.1  Moderate cervical dysplasia  Jaswinder Cabral MD

 

 2019  K12.1  Other forms of stomatitis  Jaswinder Cabral MD

 

 2019  K73.9  Chronic hepatitis, unspecified  Jaswinder Cabral MD

 

 2019  N76.0  Acute vaginitis  Jaswinder Cabral MD

 

 2019  D06.9  Carcinoma in situ of cervix, unspecified  Jaswinder Cabral MD

 

 2019  R87.810  Cervical high risk human papillomavirus (HPV)  Jaswinder Cabral MD



     Dna test positive  

 

 2019  N90.89  Other specified noninflammatory disorders of  Jaswinder Cabral MD



     vulva and perineum  

 

 2019  N87.1  Moderate cervical dysplasia  Jaswinder Cabral MD

 

 2019  N90.89  Other specified noninflammatory disorders of  Jaswinder Cabral MD



     vulva and perineum  

 

 2019  N87.1  Moderate cervical dysplasia  Jaswinder Cabral MD

 

 2019  N90.89  Other specified noninflammatory disorders of  Jaswinder Cabral MD



     vulva and perineum  

 

 2019  R87.810  Cervical high risk human papillomavirus (HPV)  Jaswinder Cabral MD



     Dna test positive  

 

 2019  N87.1  Moderate cervical dysplasia  Jaswinder Cabral MD







Plan of Treatment

Future Appointment(s):2020 10:30 am - [facility]winder [facility]ral [facility]2019 - Jaswinder Cabral MDN87.1 Moderate cervical 
juihwafuwN25.1 Other forms of sotugodomkU08.9 Chronic hepatitis, 
unspecifiedReferral:Onelia Singer NP, Family/NPN76.0 Acute vaginitisNew Labs:
Gardnerella/Yeast: Vaginal Dna, Ordered: 19



Functional Status







 Description

 

 No Information Available







Mental Status







 Description

 

 No Information Available







Referrals







 Refer to   Reason for Referral  Status  Appt Date

 

 Onelia Singer NP    Created  









 [address] [place] [address] [phone]

 

 









 Onelia Singer NP    Sent  









 [address] [place] [address] [phone]

## 2020-01-08 NOTE — UC
UC General HPI





- HPI Summary


HPI Summary: 





28 yo with vomiting and diarrhea x 3 days, with severe chills and myalgias. She 

last vomited this morning along with loose stool x 1 today. Overall has had 

less diarrhea today. 





- History of Current Complaint


Chief Complaint: UCGeneralIllness


Stated Complaint: VOMITING DIARRHEA


Time Seen by Provider: 01/08/20 10:46


Hx Obtained From: Patient


Hx Last Menstrual Period: depo


Onset/Duration: Gradual Onset, Lasting Days - 3


Timing: Constant


Onset Severity: Moderate


Current Severity: Moderate


Pain Intensity: 6


Associated Signs & Symptoms: Positive: Diarrhea, Nausea, Vomiting, Weakness, 

Other - myalgias





- Allergy/Home Medications


Allergies/Adverse Reactions: 


 Allergies











Allergy/AdvReac Type Severity Reaction Status Date / Time


 


No Known Allergies Allergy   Verified 01/08/20 09:36














PMH/Surg Hx/FS Hx/Imm Hx


Previously Healthy: Yes - past + hep C antibodies, normal liver function


Other History Of: Hepatitis C





- Surgical History


Surgical History: Yes


Surgery Procedure, Year, and Place: cone biopsy





- Family History


Known Family History: Positive: Cardiac Disease





- Social History


Alcohol Use: Weekly


Alcohol Amount: 2-3 BEERS


Substance Use Type: None


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


Smoking Status (MU): Heavy Every Day Tobacco Smoker


Type: Cigarettes


Amount Used/How Often: 1 PPD X 10+ YEARS


Have You Smoked in the Last Year: No





Review of Systems


All Other Systems Reviewed And Are Negative: Yes


Constitutional: Positive: Fever, Fatigue


Skin: Positive: Negative


Eyes: Positive: Negative


ENT: Positive: Sinus Congestion


Respiratory: Positive: Negative


Cardiovascular: Positive: Negative


Gastrointestinal: Positive: Vomiting, Diarrhea, Nausea.  Negative: Abdominal 

Pain


Genitourinary: Positive: Negative


Motor: Positive: Negative


Neurovascular: Positive: Negative


Musculoskeletal: Positive: Myalgia


Neurological: Positive: Headache


Psychological: Positive: Negative


Is Patient Immunocompromised?: No





Physical Exam


Triage Information Reviewed: Yes


Appearance: No Pain Distress, Ill-Appearing - looks mildly unwell


Vital Signs: 


 Initial Vital Signs











Temp  99.9 F   01/08/20 09:30


 


Pulse  97   01/08/20 09:30


 


Resp  18   01/08/20 09:30


 


BP  140/71   01/08/20 09:30


 


Pulse Ox  99   01/08/20 09:30











Eye Exam: Normal


ENT: Positive: Pharynx normal, TMs normal, Other - moist mucous membranes


Neck: Positive: Supple, Nontender, No Lymphadenopathy


Respiratory: Positive: Lungs clear, Normal breath sounds


Cardiovascular: Positive: RRR


Abdomen Description: Positive: Nontender, No Organomegaly, Soft


Musculoskeletal Exam: Normal


Neurological Exam: Normal


Psychological Exam: Normal


Skin Exam: Normal





Diagnostics





- Laboratory


Lab Results: 





flu negative. 





Course/Dx





- Course


Course Of Treatment: 





Continue clear fluids, zofran given, follow up if develops symptoms of 

dehydration. 





- Differential Dx - Multi-Symptom


Differential Diagnoses: Other - gastroenteritis, influenza





- Diagnoses


Provider Diagnosis: 


 Gastroenteritis








Discharge ED





- Sign-Out/Discharge


Documenting (check all that apply): Patient Departure


All imaging exams completed and their final reports reviewed: No Studies





- Discharge Plan


Condition: Stable


Disposition: HOME


Forms:  *Work Release


Referrals: 


No Primary Care Phys,NOPCP [Primary Care Provider] - 


Additional Instructions: 


Rest at home, with steady intake of clear fluids until nausea and vomiting 

resolve. 


You have had a dose of ondansetron to decrease nausea. 


Follow up if you have persistent vomiting or increasing abdominal pain. 





- Billing Disposition and Condition


Condition: STABLE


Disposition: Home

## 2020-01-08 NOTE — XMS REPORT
Continuity of Care Document (CCD)

 Created on:2019



Patient:Ronaldo Parham

Sex:Female

:1990

External Reference #:[id number]





Demographics







 Address  [address] [place] [address]

 

 Home Phone  [phone]

 

 Mobile Phone  [phone]

 

 Preferred Language  en

 

 Marital Status  Not  or 

 

 Islam Affiliation  Unknown

 

 Race  White

 

 Ethnic Group  Not  or 









Author







 Name  Jaswinder Cabral MD (transmitted by agent of provider Annia Arriola)

 

 Address  [facility] [place] [address]









Problems







 Description

 

 No Information Available







Social History







 Type  Date  Description  Comments

 

 Birth Sex    Unknown  

 

 Tobacco Use  Start: Unknown  Heavy tobacco smoker  



     (more than 10  



     cigarettes/day)  

 

 ETOH Use    Currently consumes  3 drinks per week



     alcohol  

 

 Tobacco Use  Start: Unknown  Patient is a current  



     smoker, smokes every  



     day  

 

 Recreational Drug Use    Former Drug User  

 

 Smoking Status  Reviewed: 19  Patient is a current  



     smoker, smokes every  



     day  

 

 Exercise Type/Frequency    Does not exercise  







Allergies, Adverse Reactions, Alerts







 Description

 

 No Known Drug Allergies







Medications







 Active Medications  SIG  Qnty  Indications  Ordering  Date



         Provider  

 

 Clotrimazole/Betamet  apply to corners of  30gm    Jaswinder Cabral,  2019



 hasone Dipropionate  the mouth twice a day      MD  



   until irritation        



 1-0.05% Cream  resolves        



           

 

 Depo-Provera  150mg intramuscular      Unknown  



             150mg/ml  every 3 months        



 Suspension          



           

 

 Tylenol  2 tablets every 4      Unknown  



        325mg  hours as needed for        



 Capsules  pain        



           









 History Medications









 Ibuprofen  one tablet by  20tabs  N87.1  Jaswinder Cabral MD  2019 -



           600mg  mouth q6 as        2019



 Tablets  needed pain        



           

 

 Oxycodone-Acetamino  one tablet by  8tabs  N87.1  Jaswinder Cabral MD  2019 -



 phen  mouth q6 hours        2019



      5-325mg  as needed strong        



 Tablets  pain        



           







Immunizations







 Description

 

 No Information Available







Vital Signs







 Date  Vital  Result  Comment

 

 2019  1:34pm  Height  61 inches  5'1"









 Weight  142.25 lb  

 

 Heart Rate  90 /min  

 

 BP Systolic  135 mmHg  

 

 BP Diastolic  84 mmHg  

 

 O2 % BldC Oximetry  99 %  

 

 BMI (Body Mass Index)  26.9 kg/m2  









 2019 10:58am  Height  61 inches  5'1"









 Weight  139.00 lb  

 

 Heart Rate  88 /min  

 

 BP Systolic  135 mmHg  

 

 BP Diastolic  80 mmHg  

 

 O2 % BldC Oximetry  99 %  

 

 BMI (Body Mass Index)  26.3 kg/m2  







Results







 Test  Acquired Date  Facility  Test  Result  H/L  Range  Note

 

 Surgical  2019  [facility]  Surgical  SEE RESULT      1



 Pathology    [address]  Pathology  BELOW      



     [place] [address]          



     [phone]          









 [facility]  SEE IMAGE      









 1  SEE RESULT BELOW



   Name:  RONALDO PARHAM              : 1990    Attend Dr: Jaswinder Cabral MD



   Acct:  [id number]  Unit: [id number]  AGE: 29            Location:  OR



   Re19                        SEX: F             Status:    REG [facility]



   -----------------------------------------------------------------------------
---------------



   



   SPEC: [id number]            BERHANE:       SUBM DR: Jaswinder Cabral MD



   REQ:  [id number]             RECD: 19-



   STATUS: SOUT



   _



   ORDERED:  LEVEL 4/2, LEVEL 5



   



   FINAL DIAGNOSIS



   1.  Uterus, cervix, LEEP excision:



   --Focal high-grade squamous intraepithelial lesion (severe dysplasia, TIGIST III
).



   -- Inked and cauterized margins of resection are clear.



   2.   Uterus, endocervix, curettage:



   -- Benign endocervical glandular epithelium and mucus.



   -- No evidence of neoplasia or dysplasia identified.



   3.   Perineum, biopsy:



   -- Benign, predominantly intradermal melanocytic nevus, neurotized.



   -----------------------------------------------------------------------------
---------------



   Comment: Immunohistochemical stains for p16 (HPV surrogate



   marker) (was performed with appropriate controls on blocks 1A



   through C which support the above render diagnosis.



   



   



   PRE-OPERATIVE DIAGNOSIS



   Cervical dysplasia



   



   



   



   



   ** CONTINUED ON NEXT PAGE **



   



   DEPARTMENT OF PATHOLOGY,  [address] [place] [address]



   Phone # [phone]      Fax #[phone]



   Beau Nava M.D. Director     [facility] # [id number]



   



   



   



   



   



   



   GROSS DESCRIPTION



   1.   The specimen is received in formalin labeled, Cervical Biopsy with Cut 
at 12:00, and



   consists of a 2.1 x 1.5 cm tan-pink previously incised ovoid rubbery focally 
cauterized soft



   tissue fragment excised to a maximum depth of 1.9 cm.  The specimen is 
consistent with a



   cervical LEEP biopsy; the incision designates 12:00.  The ectocervix is 
smooth to wrinkled



   tan-pink and the endocervix is mucinous tan-red.  The specimen is inked as 
follows:



   ectocervical margin-black and endocervical margin-blue, serially sectioned 
in a clockwise



   radial fashion beginning at 12:00 and entirely submitted in four cassettes.



   2.   The specimen is received in formalin labeled, Endocervical Curettage, 
and consists of a



   0.7 x 0.6 x 0.1 cm aggregate of tan-red irregular soft tissue fragments and 
blood-tinged



   mucus.  Entirely submitted, one cassette.



   3.   The specimen is received in formalin labeled, Perineal Biopsy, and 
consists of a 0.6 x



   0.5 x 0.3 cm tan ovoid hairbearing skin fragment which is inked, bisected 
and submitted



   entirely in one cassette.



   



   Signed by and Reported on: __________              Beau Nava MD  1232



   



   



   -----------------------------------------------------------------------------
---------------



   



   



   



   



   



   



   



   



   



   



   



   



   



   



   



   



   



   



   



   



   



   



   ** END OF REPORT **



   



   



   DEPARTMENT OF PATHOLOGY,  [address] [place] [address]



   Phone # [phone]      Fax #[phone]



   Beau Nava M.D. Director     [facility] # [id number]







Procedures







 Date  Code  Description  Status

 

 2019  69993  Colposcopy,W/Loop Electrode Conization Of The Cervix  
Completed

 

 2019  78561  Colposcopy,With Biopsy  Completed







Medical Devices







 Description

 

 No Information Available







Encounters







 Type  Date  Location  Provider  Dx  Diagnosis

 

 Office Visit  2019  [facility]  Jaswinder Cabral MD  N87.1  Moderate 
cervical



   4:00p  Clinic [facility]      dysplasia







Assessments







 Date  Code  Description  Provider

 

 2019  N87.1  Moderate cervical dysplasia  Jaswinder Cabral MD

 

 2019  K12.1  Other forms of stomatitis  Jaswinder Cabral MD

 

 2019  K73.9  Chronic hepatitis, unspecified  Jaswinder Cabral MD

 

 2019  N76.0  Acute vaginitis  Jaswinder Cabral MD

 

 2019  D06.9  Carcinoma in situ of cervix, unspecified  Jaswinder Cabral MD

 

 2019  R87.810  Cervical high risk human papillomavirus (HPV)  Jaswinder Cabral MD



     Dna test positive  

 

 2019  N90.89  Other specified noninflammatory disorders of  Jaswinder Cabral MD



     vulva and perineum  

 

 2019  N87.1  Moderate cervical dysplasia  Jaswinder Cabral MD

 

 2019  N90.89  Other specified noninflammatory disorders of  Jaswinder Cabral MD



     vulva and perineum  

 

 2019  N87.1  Moderate cervical dysplasia  Jaswinder Cabral MD

 

 2019  N90.89  Other specified noninflammatory disorders of  Jaswinder Cabral MD



     vulva and perineum  

 

 2019  R87.810  Cervical high risk human papillomavirus (HPV)  Jaswinder Cabral MD



     Dna test positive  

 

 2019  N87.1  Moderate cervical dysplasia  Jaswinder Cabral MD







Plan of Treatment

Future Appointment(s):2020 10:30 am - Jaswinder Cabral MD at [facility]2019 - Jaswinder Cabral MDN87.1 Moderate cervical 
pjjmgcfhhP93.1 Other forms of ygmztefpczM97.9 Chronic hepatitis, 
unspecifiedReferral:Onelia Singer NP, Family/NPN76.0 Acute vaginitisNew Labs:
Gardnerella/Yeast: Vaginal Dna, Ordered: 19



Functional Status







 Description

 

 No Information Available







Mental Status







 Description

 

 No Information Available







Referrals







 Refer to Dr  Reason for Referral  Status  Appt Date

 

 Onelia Singer NP    Created  









 [address] [place] [address] [phone]

 

 









 Onelia Singer NP    Sent  









 [address] [place] [address] [phone]

## 2020-01-08 NOTE — XMS REPORT
Continuity of Care Document (C-CDA R2.1) (Encounter date: 2019 03:14 PM)

 Created on:2019



Patient:YRN

Sex:Female

:1990

External Reference #:[id number]





Demographics







 Address  [address] [place] [address]

 

 Work Phone  [phone]

 

 Mobile Phone  [phone]

 

 Home Phone  [phone]

 

 Email Address  [email or web address]

 

 Preferred Language  und

 

 Marital Status  Not  or 

 

 Shinto Affiliation  Unknown

 

 Race  Unknown

 

 Additional Race(s)  Other Race

 

 Ethnic Group  Not  or 









Author







 Organization  [facility]

 

 Address  [address] [place] [address]

 

 Phone  [phone]









Support







 Name  Relationship  Address  Phone

 

 KORINA POOL  Unavailable  Unavailable  [phone]









Care Team Providers







 Name  Role  Phone

 

 Karla Vogt MD  Unavailable  Unavailable









Allergies, Adverse Reactions, Alerts







 Substance  Reaction  Status

 

 No Known Allergies    Active







Medications







 Medication  Instructions  Dosage  Effective Dates  Status  Comments



       (start - stop)    

 

 Depo-Provera 150  IM Q 11-13 weeks     -  Active  



 mg/mL intramuscular      May-    



 suspension          

 

 ZITHROMAX (unknown    Not Available   -  Active  



 strength)          

 

 ALBUTEROL INHALER    Not Available   -  Active  



 (unknown strength)          







Problems







 Condition  Effective Dates  Clinical Status  Comments



   (start - stop)    

 

 Encounter for surveillance      



 of injectable contraceptive      

 

 Encounter for surveillance      



 of injectable contraceptive      

 

 Human immunodeficiency virus  Sep- -    



 [HIV] counseling      

 

 Encounter for surveillance      



 of injectable contraceptive      

 

 Carcinoma in situ of cervix,      



 unspecified      

 

 Anogenital (venereal) warts      

 

 High grade intrepith lesion      



 cyto smr crvx (HGSIL)      

 

 Cervical high risk HPV DNA      



 test positive      

 

 Personal history of cervical      



 dysplasia      

 

 Human immunodeficiency virus   -    



 [HIV] counseling      

 

 Encounter for screening for   -    



 human immunodeficiency virus      

 

 Encntr for gyn exam      



 (general) (routine) w/o abn      



 findings      

 

 Encounter for oth screening      



 for malignant neoplasm of      



 breast      

 

 Encntr screen for infections      



 w sexl mode of transmiss      

 

 Encounter for initial      



 prescription of injectable      



 contracep      

 

 Encounter for initial      



 prescription of      



 contraceptive pills      

 

 Encounter for screening for      



 malignant neoplasm of cervix      

 

 Encntr for gyn exam      



 (general) (routine) w/o abn      



 findings      

 

 Encounter for oth screening      



 for malignant neoplasm of      



 breast      

 

 Encounter for surveillance      



 of injectable contraceptive      

 

 Encntr screen for infections      



 w sexl mode of transmiss      

 

 Encounter for initial      



 prescription of injectable      



 contracep      

 

 Tobacco use      

 

 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



 cervix      .  LEEP 



       TIGIST 3 clear margins







Procedures







 Procedure  Date









 No information







Results







 Test Name  Date and Time  Measure  Units  Reference Range  Abnormal Flag  
Status  Comments









 No information







Advance Directives







 Directive  Yes / No  Effective Date  File Name









 No information







Encounters







 Encounter  Practice  Location  Reason(s)  Diagnoses  Date  Provider  Providers



 Description      For Visit        Copied on



               Encounter

 

   [facility]  [facility]      Dec-0  Torie  



   [facility]  



   [facility]        [address]  



   [address]  



   [place]  



   [address]          [place],  



   [address], [place],          [place],  



   [place],          [address].  



   [id number],          tel:+60  



   [place]          [phone]  



   tel:+60            



   [phone]            

 

   Planned  [facility]    Encounter for    Lyubov  Referring



   [facility]    surve[facility]    Betsy.  Provider:



   [facility]      injectable  [address]  [place]y



   [place]      contraceptive    [place]  Lyubov J,



   [facility]          [place],



   , [place],          [place],



   [place],          [address],  [place], [address].



   [id number],          [place].  tel:+60



   [place]          tel:60  [phone]



   tel:+[phone]          [phone]  sulting



   [id number]            Provider:



               NURSE OR



               [place] [facility].

 

   Planned  [facility]    Encounter for  Sep-1  Villa  Referring



   [facility]    [facility]    Doretha.  Provider:



   [facility]      injectable  [address]  Doretha



   [place]      contraceptiveHuman    [place]  [address], [address]



   [address]      immunodeficiency    [address]



   [address]      virus [HIV]    [place],  ,



   , [place],      counseling    [place]



   [place],          [address].  [place], [address].



   [id number],          tel:+60  tel:+[phone]



   [place]          [phone]  [id number]



   tel:[phone]            



   [phone]            

 

   Planned  [facility]    Encounter for    White  Referring



   [facility]  [place]    surveillance of    Jyoti.  Provider:



   [facility]      injectable  [address]  Jyoti



   [address]      contraceptive    [place]  [address]          [place]



   [address], [place],



   [place],          [address],  [place],



   [id number],          [place].  [address].Cons



   [place]            ulting



   tel:[phone]            Provider:



   [id number]            NURSE OR



               [place] [facility].

 

   Planned  [facility]    Carcinoma in situ  May-0  Torie  



   [facility]  [place]    of cervix,  7-201  Karla.  



   [facility]      unspecifiedAnogenit  [address]  



   [address]      al (venereal) warts    [address]  



   [place],  



   [address]          [place],  



   [address], [place],          [place],  



   [place],          [address].  



   [phone],          tel:+  



   [place]          [phone]  



   tel:+            



   [phone]            

 

   Planned  [facility]    High grade  May-0  Torie  Referring



   [facility]  [place]    intrepith lesion  2-201  Karla.  Provider:



   Yeyo      cyto smr crvx  [address]  Karla



   [address]      (HGSIL)Cervical    [address]  Torie R,



   [place]      high risk HPV DNA    [address]



   [address]      test    [place],  [address],



   [address], [place],      positivePersonal    [place],  [place],



   [place],      history of cervical    [address].  [place], [address].



   [id number],      dysplasia    tel:  tel:+60



   [place]          [phone]  [id number]



   tel:            



   [phone]            

 

   Planned  [facility]    Human  Apr-1  White  Referring



   [facility]  [place]    immunodeficiency  8-201  Jyoti.  Provider:



   [facility]      virus [HIV]  [address]  Jyoti



   [address]      counselingEncounter    [address]  [address]      for screening for    [address]



   [address]      human    [place],  [address],



   [address], [place],      immunodeficiency    [place],  [place],



   [place],      virusEncntr for gyn    [address],  [place], [address].



   [id number],      exam (general)    [place].  



   [place]      (routine) w/o abn      



   tel:+      findingsEncounter      



   [id number]      for oth screening      



         for malignant      



         neoplasm of      



         breastEncntr screen      



         for infections w      



         sexl mode of      



         transmissEncounter      



         for initial      



         prescription of      



         injectable      



         contracep      

 

   Planned  [facility]    Encounter for  Mar-0  Guggino  



   [facility]  [place]    initial  2-201  Simran  



   [facility]      prescription of  [address]  . [address]  



   [place]      contraceptive pills    [place]  



   [address]  



   [address]          [place],  



   [address], [place],          [place],  



   [place],          [address],  



   [id number],          [place].  



   [place]          tel:  



   tel:          [phone]  



   [phone]            

 

   Planned  [facility]    Encounter for  Dec-0  Guggino  Referring



   [facility]  [place]    screening for  6-201  Simran  Provider:



   [facility]      malignant neoplasm  [address]  . [address]  Karla



   [place]      of cervixEncntr for    [place]  [place] [address], [address]      gyn exam (general)    [address],  [address]



   [address]      (routine) w/o abn    [place],  [address],



   , [place],      findingsEncounter    [place],



   [place],      for oth screening    [address],  [place], [address].



   [phone],      for malignant    [place].  tel:+[phone]



   [place]      neoplasm of    tel:+  [phone]



   tel:+[phone]      breastEncounter for    [phone]  



   [id number]      surveillance of      



         injectable      



         contraceptiveEncntr      



         screen for      



         infections w sexl      



         mode of transmiss      

 

   Planned  [facility]    Encounter for  Sep-0  Borglum  Referring



   [facility]  [place]    initial  8-201  Onelia.  Provider:



   [facility]      prescription of  [address]  Karla



   [place]      injectable    [place]  [address], [address]      contracepTobacco    [address]      use    [place],  [address] [facility], [place],          [place],  [place],



   [place],          [address],  [place], [address].



   [phone],          [place].  tel:+60



   [place]          tel:  [phone]



   tel:+72          [phone]  



   [id number]            







Family History







 Family Member  Diagnosis  Age At Onset

 

 1st degree relative  No hx of venous thromboembolism  

 

 Father  No history of Stroke before age 55  

 

 1st degree relative  No hx of osteoporosis  

 

 Father  No history of Myocardial infarction before age 55  

 

 1st degree relative  No hx of cancer of breast, colon, endometrium or  



   ovary  

 

 Mother  Myocardial infarction  45

 

 Mother  No history of Stroke before age 65  







Immunizations







 Vaccine  Date  Status  Comments









 No information







Payers







 Payer name  Insurance type  Covered party ID  Authorization(s)

 

 Oh J[facility]  CI  [id number]  







Social History







 Type  Description  Quantity  Date Captured  Comments

 

 Alcohol Use Details  Unknown    Dec-  

 

 Caffeine Use Details  Unknown    Dec-  

 

 Tobacco Use Status      Dec-  

 

 Smoking Status  Heavy tobacco smoker    Dec-  

 

 Birth Sex  Female      







Vital Signs







 Date /  Height  Weight  BMI  Pulse  Blood  Temperature  Respiratory  Body  
Head  BMI  Pulse  Inhaled



 Time:        Rate  Pressure    Rate  Surface  Circumference  percentile  Ox  Ox



                 Area        









 No information







Chief Complaint And Reason For Visit

No information



Reason For Referral







 Reason For Referral

 

 No information







Plan Of Treatment







 Date  Type  Action  Status

 

 May-  Goal  Tobacco cessation counseling  completed

 

 Mar-  Goal  Tobacco cessation counseling  completed

 

 Dec-  Goal  Tobacco cessation counseling  completed

 

 Sep-  Goal  Tobacco cessation counseling  completed

 

 Sep-  Goal  Tobacco cessation counseling  completed

 

 May-  Referral   Ordered:  ordered



     Gynecologist (related to Carcinoma in situ of cervix, unspecified)  

 

 May-  Referral   Ordered:  ordered



     Referrals: Gynecologist. Follow-up and treat  







History Of Present Illness







 Encounter Date  Complaint  History Of Present Illness









 No information







Functional Status







 Date  Functional Assessment









 No information







Medications Administered







 Medication  Instructions  Dosage  Effective Dates (start - stop)  Status  
Comments









 No information







Instructions







 Date  Instruction  Additional Information









 No information







Assessments







 Type  Assessment  Date









 No information







Goals







 Health Concern  Goal  Type  Priority  Status  Date









 No information







Medical Equipment







 Description  Device  Universal Device Identifier  Effective Dates (start - stop
)  Status









 No information







Mental Status







 Date  Cognitive Assessment









 No information







Health Concerns







 Observation  Date









 No information









 Concern  Status  Date









 No information

## 2020-01-08 NOTE — XMS REPORT
Continuity of Care Document (C-CDA R2.1) (Encounter date: 2019 07:35 AM)

 Created on:December 10, 2019



Patient:YRN

Sex:Female

:1990

External Reference #:[id number]





Demographics







 Address  [address] [place] [address]

 

 Work Phone  [phone]

 

 Mobile Phone  [phone]

 

 Home Phone  [phone]

 

 Email Address  [email or web address]

 

 Preferred Language  und

 

 Marital Status  Not  or 

 

 Congregational Affiliation  Unknown

 

 Race  Unknown

 

 Additional Race(s)  Other Race

 

 Ethnic Group  Not  or 









Author







 Organization  [facility]

 

 Address  [address] [place] [address]

 

 Phone  [phone]









Support







 Name  Relationship  Address  Phone

 

 KORINA POOL  Unavailable  Unavailable  [phone]









Care Team Providers







 Name  Role  Phone

 

 Karla Vogt MD  Unavailable  Unavailable









Allergies, Adverse Reactions, Alerts







 Substance  Reaction  Status

 

 No Known Allergies    Active







Medications







 Medication  Instructions  Dosage  Effective Dates  Status  Comments



       (start - stop)    

 

 Depo-Provera 150  IM Q 11-13 weeks     -  Active  



 mg/mL intramuscular      May-    



 suspension          

 

 ZITHROMAX (unknown    Not Available   -  Active  



 strength)          

 

 ALBUTEROL INHALER    Not Available   -  Active  



 (unknown strength)          







Problems







 Condition  Effective Dates  Clinical Status  Comments



   (start - stop)    

 

 Encounter for surveillance      



 of injectable contraceptive      

 

 Encounter for surveillance      



 of injectable contraceptive      

 

 Human immunodeficiency virus  Sep- -    



 [HIV] counseling      

 

 Encounter for surveillance      



 of injectable contraceptive      

 

 Carcinoma in situ of cervix,      



 unspecified      

 

 Anogenital (venereal) warts      

 

 High grade intrepith lesion      



 cyto smr crvx (HGSIL)      

 

 Cervical high risk HPV DNA      



 test positive      

 

 Personal history of cervical      



 dysplasia      

 

 Human immunodeficiency virus   -    



 [HIV] counseling      

 

 Encounter for screening for   -    



 human immunodeficiency virus      

 

 Encntr for gyn exam      



 (general) (routine) w/o abn      



 findings      

 

 Encounter for oth screening      



 for malignant neoplasm of      



 breast      

 

 Encntr screen for infections      



 w sexl mode of transmiss      

 

 Encounter for initial      



 prescription of injectable      



 contracep      

 

 Encounter for initial      



 prescription of      



 contraceptive pills      

 

 Encounter for screening for      



 malignant neoplasm of cervix      

 

 Encntr for gyn exam      



 (general) (routine) w/o abn      



 findings      

 

 Encounter for oth screening      



 for malignant neoplasm of      



 breast      

 

 Encounter for surveillance      



 of injectable contraceptive      

 

 Encntr screen for infections      



 w sexl mode of transmiss      

 

 Encounter for initial      



 prescription of injectable      



 contracep      

 

 Tobacco use      

 

 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



 cervix      .  LEEP 



       TIGIST 3 clear margins







Procedures







 Procedure  Date









 No information







Results







 Test Name  Date and Time  Measure  Units  Reference Range  Abnormal Flag  
Status  Comments









 No information







Advance Directives







 Directive  Yes / No  Effective Date  File Name









 No information







Encounters







 Encounter  Practice  Location  Reason(s)  Diagnoses  Date  Provider  Providers



 Description      For Visit        Copied on



               Encounter

 

   [facility]anned  [facility]      Dec-0  Torie  



   [facility]        [facility]  



   [facility]        [address]  



   [address]          [place]  



   [place]  



   [address]          [place],  



   [address], [place],          [place],  



   [place],          [address].  



   [id number],          tel:+60  



   [place]          [phone]  



   tel:+[phone]            



   [phone]            

 

   Planned  [facility]    Encounter for    Lyubov  Referring



   [facility]    surveillance of    [place]y.  Provider:



   [facility]      injectable  [address]  [place]



   [address]      contraceptive    [place]  [place] [address],



   [facility] [place]          [place],



   , [place],



   [place],          [address],  [place], [address].



   [id number],          [place].  tel:+60



   [place]          tel:60  [phone]



   tel:[phone]          [phone]  sulting



   [id number]            Provider:



               NURSE OR



               [place] [facility]PS[facility].

 

   Planned  [facility]    Encounter for  Sep-1  Villa  Referring



   [facility]    surve[facility] of    Doretha.  Provider:



   [facility]      injectable  [address]  [place]



   [address]      contraceptiveHuman    [place]  [address], [address]



   [address]      immunodeficiency    [address],  [address] [place]



   [address] [place]      virus [HIV]    [place],  ,



   , [place],      [place]ing    [place],



   [place],          [address].  [place], [address].



   [id number],          tel:+60  tel:+607



   [place]          [phone]  [id number]



   tel:+[phone]            



   [phone]            

 

   Planned  [facility]    Encounter for    White  Referring



   [facility]  [place]    surveillance of    Jyoti.  Provider:



   [facility]      injectable  [address]  [place]



   [place]      contraceptive    [place]  [place], [address]



   [address],  [address] [place]



   [address]          [place],  [address],



   [address], [place],



   [place],          [address],  [place],



   [id number],          [place].  [address].Cons



   [place]            ulting



   tel:[phone]            Provider:



   [id number]            NURSE OR



               MA PPSFL.

 

   Planned  [facility]    Carcinoma in situ  May-0  Torie  



   [facility]  [place]    of cervix,  7-201  Karla.  



   [facility]      unspecifiedAnogenit  [address]  



   [address]      al (venereal) warts    [place]  



   [place],  



   [address] [place]          [place],  



   [address], [place],          [place],  



   [place],          [address].  



   [phone],          tel:+  



   [place]          [phone]  



   tel:+            



   [phone]            

 

   Planned  [facility]    High grade  May-0  Torie  Referring



   [facility]  [place]    intrepith lesion  2-201  Karla.  Provider:



   Yeyo      cyto smr crvx  [address]  Karla



   [address]      (HGSIL)Cervical    [place]  Torie R,



   [address]      high risk HPV DNA    [address]



   [address] [place]      test    [place],  [address],



   [address], [place],      positivePersonal    [place],  [place],



   [place],      history of [place]    [address].  [place], [address].



   [id number],      dysplasia    tel:  tel:+60



   [place]          [phone]  [id number]



   tel:            



   [phone]            

 

   Planned  [facility]    Human  Apr-1  White  Referring



   [facility]  [place]    immunodeficiency  8-201  Jyoti.  Provider:



   [facility]      virus [HIV]  [address]  [place]



   [address]      counselingEncounter    [place]  [place], [address]



   [address]      for screening for    [address],  [address] [place]



   [address] [place]      human    [place],  [address],



   [address], [place],      immunodeficiency    [place],  [place],



   [place],      virusEncntr for gyn    [address],  [place], [address].



   [id number],      exam (general)    [place].  



   [place]      (routine) w/o abn      



   tel:+      findingsEncounter      



   [id number]      for oth screening      



         for malignant      



         neoplasm of      



         breastEncntr screen      



         for infections w      



         sexl mode of      



         transmissEncounter      



         for initial      



         prescription of      



         injectable      



         contracep      

 

   Planned  [facility]PS[facility]    Encounter for  Mar-0  Guggino  



   [facility]  [place]    initial  2-201  Simran  



   [facility]      prescription of  [address]  . [address]  



   [address]      contraceptive pills    [place]  



   [place],  



   [address] [place]          [place],  



   St, [place],          [place],  



   [place],          [address],  



   [phone],          [place].  



   [place]          tel:  



   tel:          [phone]  



   [phone]            

 

   Planned  [facility]    Encounter for  Dec-0  Guggino  Referring



   [facility]  [place]    screening for  6-201  Simran  Provider:



   [facility]      malignant neoplasm  [address]  . [address]  Karla



   [place]      of cervixEncntr for    [place]  Torie [place], [address]      gyn exam (general)    [address],  [address]



   [address] [place]      (routine) w/o abn    [place],  [address],



   , [place],      findingsEncounter    [place],



   [place],      for oth screening    [address],  [place], [address].



   [id number],      for malignant    [place].  tel:+[phone]



   [place]      neoplasm of    tel:+  [phone]



   tel:+[phone]      breastEncounter for    [id number]  



   [id number]      surveillance of      



         injectable      



         contraceptiveEncntr      



         screen for      



         infections w sexl      



         mode of transmiss      

 

   Planned  [facility]PS[facility]    Encounter for  Sep-0  Borglum  Referring



   [facility]  [place]    initial  8-201  [place].  Provider:



   [facility]      prescription of  [address]  Karla



   [address]      injectable    [place]  [address]      contracepTobacco    [address] [place]      use    [place],  [facility], [place],          [place],



   [place],          [address],  [place], [address].



   [phone],          [place].  tel:+60



   [place]          tel:+  [phone]



   tel:+72          [phone]  



   [phone]            







Family History







 Family Member  Diagnosis  Age At Onset

 

 1st degree relative  No hx of venous thromboembolism  

 

 Father  No history of Stroke before age 55  

 

 1st degree relative  No hx of osteoporosis  

 

 Father  No history of Myocardial infarction before age 55  

 

 1st degree relative  No hx of cancer of breast, colon, endometrium or  



   ovary  

 

 Mother  Myocardial infarction  45

 

 Mother  No history of Stroke before age 65  







Immunizations







 Vaccine  Date  Status  Comments









 No information







Payers







 Payer name  Insurance type  Covered party ID  Authorization(s)

 

 Oh JAMES [facility]  [employer]  [id number]  







Social History







 Type  Description  Quantity  Date Captured  Comments

 

 Alcohol Use Details  Unknown    Dec-  

 

 Caffeine Use Details  Unknown    Dec-  

 

 Tobacco Use Status      Dec-  

 

 Smoking Status  Heavy tobacco smoker    Dec-  

 

 Birth Sex  Female      







Vital Signs







 Date /  Height  Weight  BMI  Pulse  Blood  Temperature  Respiratory  Body  
Head  BMI  Pulse  Inhaled



 Time:        Rate  Pressure    Rate  Surface  Circumference  percentile  Ox  Ox



                 Area        









 No information







Chief Complaint And Reason For Visit

No information



Reason For Referral







 Reason For Referral

 

 No information







Plan Of Treatment







 Date  Type  Action  Status

 

 May-  Goal  Tobacco cessation counseling  completed

 

 Mar-  Goal  Tobacco cessation counseling  completed

 

 Dec-  Goal  Tobacco cessation counseling  completed

 

 Sep-  Goal  Tobacco cessation counseling  completed

 

 Sep-  Goal  Tobacco cessation counseling  completed

 

 May-  Referral   Ordered:  ordered



     Gynecologist (related to Carcinoma in situ of cervix, unspecified)  

 

 May-  Referral   Ordered:  ordered



     Referrals: Gynecologist. Follow-up and treat  







History Of Present Illness







 Encounter Date  Complaint  History Of Present Illness









 No information







Functional Status







 Date  Functional Assessment









 No information







Medications Administered







 Medication  Instructions  Dosage  Effective Dates (start - stop)  Status  
Comments









 No information







Instructions







 Date  Instruction  Additional Information









 No information







Assessments







 Type  Assessment  Date









 No information







Goals







 Health Concern  Goal  Type  Priority  Status  Date









 No information







Medical Equipment







 Description  Device  Universal Device Identifier  Effective Dates (start - stop
)  Status









 No information







Mental Status







 Date  Cognitive Assessment









 No information







Health Concerns







 Observation  Date









 No information









 Concern  Status  Date









 No information

## 2020-01-08 NOTE — XMS REPORT
Continuity of Care Document (C-CDA R2.1) (Encounter date: 2019 01:00 PM)

 Created on:2019



Patient:YRN

Sex:Female

:1990

External Reference #:[id number]





Demographics







 Address  [address] [place] [address]

 

 Work Phone  [phone]

 

 Mobile Phone  [phone]

 

 Home Phone  [phone]

 

 Email Address  [email or web address]

 

 Preferred Language  und

 

 Marital Status  Not  or 

 

 Yarsanism Affiliation  Unknown

 

 Race  Unknown

 

 Additional Race(s)  Other Race

 

 Ethnic Group  Not  or 









Author







 Organization  [facility]

 

 Address  [address] [place] [address]

 

 Phone  [phone]









Support







 Name  Relationship  Address  Phone

 

 KORINA POOL  Unavailable  Unavailable  [phone]









Care Team Providers







 Name  Role  Phone

 

 Lyubov Betsy JESUS  Unavailable  Unavailable

 

 PPSFL, NURSE OR MA  Unavailable  Unavailable









Allergies, Adverse Reactions, Alerts







 Substance  Reaction  Status

 

 No Known Allergies    Active







Medications







 Medication  Instructions  Dosage  Effective Dates  Status  Comments



       (start - stop)    

 

 Depo-Provera 150  IM Q 11-13 weeks     -  Active  



 mg/mL intramuscular      May-    



 suspension          

 

 ZITHROMAX (unknown    Not Available   -  Active  



 strength)          

 

 ALBUTEROL INHALER    Not Available   -  Active  



 (unknown strength)          







Problems







 Condition  Effective Dates  Clinical Status  Comments



   (start - stop)    

 

 Encounter for surveillance      



 of injectable contraceptive      

 

 Encounter for surveillance      



 of injectable contraceptive      

 

 Human immunodeficiency virus  Sep- -    



 [HIV] counseling      

 

 Encounter for surveillance      



 of injectable contraceptive      

 

 Carcinoma in situ of cervix,      



 unspecified      

 

 Anogenital (venereal) warts      

 

 High grade intrepith lesion      



 cyto smr crvx (HGSIL)      

 

 Cervical high risk HPV DNA      



 test positive      

 

 Personal history of cervical      



 dysplasia      

 

 Human immunodeficiency virus   -    



 [HIV] counseling      

 

 Encounter for screening for   -    



 human immunodeficiency virus      

 

 Encntr for gyn exam      



 (general) (routine) w/o abn      



 findings      

 

 Encounter for oth screening      



 for malignant neoplasm of      



 breast      

 

 Encntr screen for infections      



 w sexl mode of transmiss      

 

 Encounter for initial      



 prescription of injectable      



 contracep      

 

 Encounter for initial      



 prescription of      



 contraceptive pills      

 

 Encounter for screening for      



 malignant neoplasm of cervix      

 

 Encntr for gyn exam      



 (general) (routine) w/o abn      



 findings      

 

 Encounter for oth screening      



 for malignant neoplasm of      



 breast      

 

 Encounter for surveillance      



 of injectable contraceptive      

 

 Encntr screen for infections      



 w sexl mode of transmiss      

 

 Encounter for initial      



 prescription of injectable      



 contracep      

 

 Tobacco use      

 

 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



 cervix      







Procedures







 Procedure  Date

 

 INJECTION DEPO/CEFTRIAXONE  

 

 INJECTION OR LAB ONLY VISIT EST  

 

 OTHER Medical Services  

 

 Contraceptive  

 

 Cns.Svc. Other  

 

 Cns.Svc. STI  

 

 DEPO  







Results







 Test Name  Date and Time  Measure  Units  Reference Range  Abnormal Flag  
Status  Comments









 No information







Advance Directives







 Directive  Yes / No  Effective Date  File Name









 No information







Encounters







 Encounter  Practice  Location  Reason(s)  Diagnoses  Date  Provider  Providers



 Description      For Visit        Copied on



               Encounter

 

   [facility]  [facility]PSFL    Encounter for    Lyubov  Referring



   [facility]  [place]    surveillance of    Betsy.  Provider:



   [facility]      injectable  [address]  Betsy



   Finger      contraceptive    [place]  Lyubov J,



   [address] [place]          [place],  [address]



   [address], [place],          [place],



   [place],          [address],  [place], [address].



   [id number],          [place].  tel:+



   [place]          tel:[phone]  [phone]



   tel:+          [phone]  sulting



   [id number]            Provider:



               NURSE OR



               MA PPSFL.

 

   [facility]anned  [facility]PSFL    Encounter for  Sep-1  Villa  Referring



   [facility]  [place]    surveillance of    Doretha.  Provider:



   [facility]      injectable  [address]  [place]



   Finger      contraceptiveHuman    [place]  [address]ef[address] [place]



   [address] [place]      virus [HIV]    [place],  ,



   , [place],      [place]ing    [place]



   [place],          [address].  [place], [address].



   [id number],          tel:+60  tel:+607



   [place]          [id number]  [id number]



   tel:+[phone]            



   [id number]            

 

   [facility]anned  PPSFL    Encounter for    White  Referring



   [facility]  [place]    surveillance of    Jyoti.  Provider:



   [facility]      injectable  [address]  Jyoti



   Finger      contraceptive    [place]  [address] [place]



   [address] [place]          [place],  [address],



   [address], [place],          [place]



   [place],          [address],  [place],



   [id number],          [place].  [address].Cons



   [place]            ulting



   tel:+[phone]            Provider:



   [id number]            NURSE OR



               MA PPSFL.

 

   [facility]  PPSFL    Carcinoma in situ  May-0  Torie  



   [facility]  [place]    of cervix,  7-201  Karla.  



   [facility]      unspecifiedAnogenit  [address]  



   Finger      al (venereal) warts    [place]  



   [address],  



   [address] [place]          [place],  



   [address], [place],          [place]  



   [place],          [address].  



   [id number],          tel:+60  



   [place]          [id number]  



   tel:+[phone]            



   [id number]            

 

   [facility]  [facility]    High grade  May-0  Torie  Referring



   [facility]  [place]    intrepith lesion  2-201  Karla.  Provider:



   Yeyo      cyto smr crvx  [address]  Karla



   Finger      (HGSIL)Cervical    [place]  [place] [address],



   [address]      high risk HPV DNA    [address] [place]      test    [place],  [address],



   [address], [place],      positivePersonal    [place]



   [place],      history of cervical    [address].  [place], [address].



   [id number],      dysplasia    tel:+60  tel:+607



   [place]          [phone]  [id number]



   tel:+72            



   [phone]            

 

   [facility]  [facility]PSFL    Human  Apr-1  White  Referring



   [facility]  [place]    immunodeficiency  8-201  Jyoti.  Provider:



   Yeyo      virus [HIV]  [address]  Jyoti



   Finger      counselingEncounter    [place]  [address]      for screening for    [address] [place]



   [address] [place]      human    [place],  [address],



   [address], [place],      immunodeficiency    [place],



   [place],      virusEncntr for gyn    [address],  [place], [address].



   [id number],      exam (general)    [place].  



   [place]      (routine) w/o abn      



   tel:+72      findingsEncounter      



   [id number]      for oth screening      



         for malignant      



         neoplasm of      



         breastEncntr screen      



         for infections w      



         sexl mode of      



         transmissEncounter      



         for initial      



         prescription of      



         injectable      



         contracep      

 

   [facility]  [facility]    Encounter for  Mar-0  Guggino  



   [facility]  [place]    initial  2-201  Simran  



   [facility]      prescription of  [address]  . [address]  



   Finger      contraceptive pills    [place]  



   [address],  



   [address] [place]          [place],  



   [address], [place],          [place],  



   [place],          [address],  



   [id number],          [place].  



   [place]          tel:+60  



   tel:+60          [phone]  



   [id number]            

 

   [facility]  [facility]    Encounter for  Dec-0  Guggino  Referring



   [facility]  [place]    screening for  6-201  Simran  Provider:



   [facility]      malignant neoplasm  [address]  . [address]  Karla



   F[place]      of cervixEncntr for    [place]  Tor[place] R,



   [place]      gyn exam (general)    [address]



   [address] [place]      (routine) w/o abn    [place],  [address],



   , [place],      findingsEncounter    [place],      for oth screening    [address],  [place], [address].



   [phone],      for malignant    [place].  tel:+



   [place]      neoplasm of    tel:+  [phone]



   tel:+72      breastEncounter for    [phone]  



   [id number]      surveillance of      



         injectable      



         contraceptiveEncntr      



         screen for      



         infections w sexl      



         mode of transmiss      

 

   Planned  PPSFL    Encounter for  Sep-0  Borglum  Referring



   [facility]  [place]    initial  8-201  Onelia.  Provider:



   [facility]      prescription of  [address]  Karla



   Finger      injectable    [place]  Torie R,



   [address]      contracepTobacco    [address]



   [address] [place]      use    [facility], [place],          [place],



   [place],          [address],  [place], [address].



   [id number],          [place].  tel:+



   [place]          tel:  [phone]



   tel:+[phone]          [phone]  



   [id number]            







Family History







 Family Member  Diagnosis  Age At Onset

 

 1st degree relative  No hx of venous thromboembolism  

 

 Father  No history of Stroke before age 55  

 

 1st degree relative  No hx of osteoporosis  

 

 Father  No history of Myocardial infarction before age 55  

 

 1st degree relative  No hx of cancer of breast, colon, endometrium or  



   ovary  

 

 Mother  Myocardial infarction  45

 

 Mother  No history of Stroke before age 65  







Immunizations







 Vaccine  Date  Status  Comments









 No information







Payers







 Payer name  Insurance type  Covered party ID  Authorization(s)

 

 Oh JAMES [facility]  [employer]  [id number]  







Social History







 Type  Description  Quantity  Date Captured  Comments

 

 Alcohol Use Details  Unknown      

 

 Caffeine Use Details  Unknown      

 

 Tobacco Use Status        

 

 Smoking Status  Heavy tobacco smoker      

 

 Birth Sex  Female      







Vital Signs







 Date /  Height  Weight  BMI  Pulse  Blood  Temperature  Respiratory  Body  
Head  BMI  Pulse  Inhaled



 Time:        Rate  Pressure    Rate  Surface  Circumference  percentile  Ox  Ox



                 Area        









 No information







Chief Complaint And Reason For Visit

No information



Reason For Referral







 Reason For Referral

 

 No information







Plan Of Treatment







 Date  Type  Action  Status

 

 May-  Goal  Tobacco cessation counseling  completed

 

 Mar-  Goal  Tobacco cessation counseling  completed

 

 Dec-  Goal  Tobacco cessation counseling  completed

 

 Sep-  Goal  Tobacco cessation counseling  completed

 

 Sep-  Goal  Tobacco cessation counseling  completed

 

 May-  Referral   Ordered:  ordered



     Gynecologist (related to Carcinoma in situ of cervix, unspecified)  

 

 May-  Referral   Ordered:  ordered



     Referrals: Gynecologist. Follow-up and treat  







History Of Present Illness







 Encounter Date  Complaint  History Of Present Illness









 No information







Functional Status







 Date  Functional Assessment









 No information







Medications Administered







 Medication  Instructions  Dosage  Effective Dates (start - stop)  Status  
Comments









 No information







Instructions







 Date  Instruction  Additional Information









 No information







Assessments







 Type  Assessment  Date

 

 assessment  Encounter for surveillance of injectable contraceptive  







Goals







 Health Concern  Goal  Type  Priority  Status  Date









 No information







Medical Equipment







 Description  Device  Universal Device Identifier  Effective Dates (start - stop
)  Status









 No information







Mental Status







 Date  Cognitive Assessment









 No information







Health Concerns







 Observation  Date









 No information









 Concern  Status  Date









 No information

## 2020-01-08 NOTE — XMS REPORT
Continuity of Care Document (C-CDA R2.1) (Encounter date: 2019 03:11 PM)

 Created on:2019



Patient:YRN

Sex:Female

:1990

External Reference #:[id number]





Demographics







 Address  [address] [address]

 

 Work Phone  [phone]

 

 Mobile Phone  [phone]

 

 Home Phone  [phone]

 

 Email Address  [email or web address]

 

 Preferred Language  und

 

 Marital Status  Not  or 

 

 Sikh Affiliation  Unknown

 

 Race  Unknown

 

 Additional Race(s)  Other Race

 

 Ethnic Group  Not  or 









Author







 Organization  [facility]

 

 Address  [address] [place] [address]

 

 Phone  [phone]









Support







 Name  Relationship  Address  Phone

 

 KORINA POOL  Unavailable  Unavailable  [phone]









Care Team Providers







 Name  Role  Phone

 

 Doretha Boston  Unavailable  Unavailable









Allergies, Adverse Reactions, Alerts







 Substance  Reaction  Status

 

 No Known Allergies    Active







Medications







 Medication  Instructions  Dosage  Effective Dates  Status  Comments



       (start - stop)    

 

 Depo-Provera 150  IM Q 11-13 weeks     -  Active  



 mg/mL intramuscular      May-    



 suspension          

 

 ZITHROMAX (unknown    Not Available   -  Active  



 strength)          

 

 ALBUTEROL INHALER    Not Available   -  Active  



 (unknown strength)          







Problems







 Condition  Effective Dates  Clinical Status  Comments



   (start - stop)    

 

 Encounter for surveillance      



 of injectable contraceptive      

 

 Encounter for surveillance      



 of injectable contraceptive      

 

 Human immunodeficiency virus  Sep- -    



 [HIV] counseling      

 

 Encounter for surveillance      



 of injectable contraceptive      

 

 Carcinoma in situ of cervix,      



 unspecified      

 

 Anogenital (venereal) warts      

 

 High grade intrepith lesion      



 cyto smr crvx (HGSIL)      

 

 Cervical high risk HPV DNA      



 test positive      

 

 Personal history of cervical      



 dysplasia      

 

 Human immunodeficiency virus   -    



 [HIV] counseling      

 

 Encounter for screening for   -    



 human immunodeficiency virus      

 

 Encntr for gyn exam      



 (general) (routine) w/o abn      



 findings      

 

 Encounter for oth screening      



 for malignant neoplasm of      



 breast      

 

 Encntr screen for infections      



 w sexl mode of transmiss      

 

 Encounter for initial      



 prescription of injectable      



 contracep      

 

 Encounter for initial      



 prescription of      



 contraceptive pills      

 

 Encounter for screening for      



 malignant neoplasm of cervix      

 

 Encntr for gyn exam      



 (general) (routine) w/o abn      



 findings      

 

 Encounter for oth screening      



 for malignant neoplasm of      



 breast      

 

 Encounter for surveillance      



 of injectable contraceptive      

 

 Encntr screen for infections      



 w sexl mode of transmiss      

 

 Encounter for initial      



 prescription of injectable      



 contracep      

 

 Tobacco use      

 

 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



 cervix      







Procedures







 Procedure  Date









 No information







Results







 Test Name  Date and Time  Measure  Units  Reference Range  Abnormal Flag  
Status  Comments









 No information







Advance Directives







 Directive  Yes / No  Effective Date  File Name









 No information







Encounters







 Encounter  Practice  Location  Reason(s)  Diagnoses  Date  Provider  Providers



 Description      For Visit        Copied on



               Encounter

 

   Planned  [facility]      Dec-0  [place]  



   [facility]  [facility]        Doretha.  



   [facility]        [address]  



   [place]          [place]  



   [place],  



   [address] [place]          [facility],  



   [address], [facility],          [place],  



   [place],          [address].  



   [id number],          tel:+  



   [place]          [phone]  



   tel:+            



   [phone]            

 

   Planned  [facility]    Encounter for    Lyubov  Referring



   [facility]    [facility]    [place].  Provider:



   [facility]      injectable  [address]  [place]y



   [place]      contraceptive    [place]  Lyubov J,



   [facility] [place]          [facility],  [address]



   [address], [facility],          [place],  [facility],



   [place],          [address],  [place], [address].



   [id number],          [place].  tel:+



   [place]          tel:  [phone]



   tel:          [phone]  sulting



   [id number]            Provider:



               NURSE OR



               [place] [facility]PS[facility].

 

   Planned  [facility]    Encounter for  Sep-1  Villa  Referring



   [facility]  [facility]    surveillance of    Doretha.  Provider:



   [facility]      injectable  [address]  Doretha



   [place]      contraceptiveHuman    [place]  [place], [address]



   [address]      immunodeficiency    [address],  [address] [place]



   [address] [place]      virus [HIV]    [facility],  ,



   , [facility],      counseling    [place],  [facility],



   [place],          [address].  [place], [address].



   [id number],          tel:+60  tel:+607



   [place]          [id number]  [id number]



   tel:+            



   [phone]            

 

   Planned  [facility]    Encounter for    White  Referring



   [facility]  [facility]    surveillance of    Jyoti.  Provider:



   [facility]      injectable  [address]  Jyoti



   [place]      contraceptive    [place]  [place] [place]



   [address] [place]          [facility],  [address],



   [address], [facility],          [place]  [facility],



   [place],          [address],  [place],



   [id number],          [place].  [address].Cons



   [place]            ulting



   tel:+            Provider:



   [id number]            NURSE OR



               MA [facility]PS[facility].

 

   Planned  [facility]    Carcinoma in situ  May-0  Torie  



   [facility]  [facility]    of cervix,  7-201  Karla.  



   [facility]      unspecifiedAnogenit  [address]  



   [place]      al (venereal) warts    [place]  



   [place],  



   [address] [place]          [facility],  



   [address], [facility],          [place],  



   [place],          [address].  



   [id number],          tel:+60  



   [place]          [phone]  



   tel:+            



   [phone]            

 

   Planned  [facility]    High grade  May-0  Torie  Referring



   [facility]  [facility]    intrepith lesion  2-201  Karla.  Provider:



   Yeyo      cyto smr crvx  [address]  Karla



   [place]      (HGSIL)Cervical    [place]  Torie R,



   [place]      high risk HPV DNA    [address]



   [address] [place]      test    [facility],  [address],



   [address], [facility],      positivePersonal    [place],  [facility],



   [place],      history of cervical    [address].  [place], [address].



   [id number],      dysplasia    tel:+  tel:+60



   [place]          [phone]  [phone]



   tel:+            



   [phone]            

 

   Planned  [facility]    Human  Apr-1  White  Referring



   [facility]  [facility]    immunodeficiency  8-201  Jyoti.  Provider:



   Yeyo      virus [HIV]  [address]  Jyoti



   [place]      counselingEncounter    [place]  [place], [address]



   [place]      for screening for    [address],  [address] [place]



   [address] [place]      human    [facility],  [address],



   St, [facility],      immunodeficiency    [place],  [facility],



   [place],      virusEncntr for gyn    [address],  [place], [address].



   [id number],      exam (general)    [place].  



   [place]      (routine) w/o abn      



   tel:+      findingsEncounter      



   [id number]      for oth screening      



         for malignant      



         neoplasm of      



         breastEncntr screen      



         for infections w      



         sexl mode of      



         transmissEncounter      



         for initial      



         prescription of      



         injectable      



         contracep      

 

   Planned  [facility]    Encounter for  Mar-0  Guggino  



   [facility]  [facility]    initial  2-201  Simran  



   [facility]      prescription of  [address]  . [address]  



   [place]      contraceptive pills    [place]  



   [place],  



   [address] [place]          [facility],  



   [address], [facility],          [place],  



   [place],          [address],  



   [id number],          [place].  



   [place]          tel:+60  



   tel:+          [phone]  



   [phone]            

 

   Planned  [facility]    Encounter for  Dec-0  Guggino  Referring



   [facility]  [facility]    screening for  6-201  Simran  Provider:



   [facility]      malignant neoplasm  [address]  . [address]  [place]la



   [place]      of cervixEncntr for    [place]  Torie RSavage, [address]      gyn exam (general)    [address],  [address]



   [address] [place]      (routine) w/o abn    [facility]  [address],



   , [facility],      findingsEncounter    [place],      for oth screening    [address],  [place], [address].



   [id number],      for malignant    [place].  tel:+



   [place]      neoplasm of    tel:+  [phone]



   tel:+      breastEncounter for    [phone]  



   [id number]      surveillance of      



         injectable      



         contraceptiveEncntr      



         screen for      



         infections w sexl      



         mode of transmiss      

 

   Planned  [facility]    Encounter for  Sep-  Borglum  Referring



   [facility]  [facility]    [place].  Provider:



   [facility]      prescription of  [address]  Karla



   [place]      injectable    [place]  Torie Savage JASSO, [address]      contracepTobacco    [address]



   [address] [place]      use    [facility], [facility],          [place],  [facility],



   [place],          [address],  [place], [address].



   [id number],          [place].  tel:+



   [place]          tel:  [phone]



   tel:+[phone]          [phone]  



   [id number]            







Family History







 Family Member  Diagnosis  Age At Onset

 

 1st degree relative  No hx of venous thromboembolism  

 

 Father  No history of Stroke before age 55  

 

 1st degree relative  No hx of osteoporosis  

 

 Father  No history of Myocardial infarction before age 55  

 

 1st degree relative  No hx of cancer of breast, colon, endometrium or  



   ovary  

 

 Mother  Myocardial infarction  45

 

 Mother  No history of Stroke before age 65  







Immunizations







 Vaccine  Date  Status  Comments









 No information







Payers







 Payer name  Insurance type  Covered party ID  Authorization(s)

 

 Oh J[facility]  [employer]  [id number]  







Social History







 Type  Description  Quantity  Date Captured  Comments

 

 Alcohol Use Details  Unknown    Dec-  

 

 Caffeine Use Details  Unknown    Dec-  

 

 Tobacco Use Status      Dec-  

 

 Smoking Status  Heavy tobacco smoker    Dec-  

 

 Birth Sex  Female      







Vital Signs







 Date /  Height  Weight  BMI  Pulse  Blood  Temperature  Respiratory  Body  
Head  BMI  Pulse  Inhaled



 Time:        Rate  Pressure    Rate  Surface  Circumference  percentile  Ox  Ox



                 Area        









 No information







Chief Complaint And Reason For Visit

No information



Reason For Referral







 Reason For Referral

 

 No information







Plan Of Treatment







 Date  Type  Action  Status

 

 May-  Goal  Tobacco cessation counseling  completed

 

 Mar-  Goal  Tobacco cessation counseling  completed

 

 Dec-  Goal  Tobacco cessation counseling  completed

 

 Sep-  Goal  Tobacco cessation counseling  completed

 

 Sep-  Goal  Tobacco cessation counseling  completed

 

 May-  Referral   Ordered:  ordered



     Gynecologist (related to Carcinoma in situ of cervix, unspecified)  

 

 May-  Referral   Ordered:  ordered



     Referrals: Gynecologist. Follow-up and treat  







History Of Present Illness







 Encounter Date  Complaint  History Of Present Illness









 No information







Functional Status







 Date  Functional Assessment









 No information







Medications Administered







 Medication  Instructions  Dosage  Effective Dates (start - stop)  Status  
Comments









 No information







Instructions







 Date  Instruction  Additional Information









 No information







Assessments







 Type  Assessment  Date









 No information







Goals







 Health Concern  Goal  Type  Priority  Status  Date









 No information







Medical Equipment







 Description  Device  Universal Device Identifier  Effective Dates (start - stop
)  Status









 No information







Mental Status







 Date  Cognitive Assessment









 No information







Health Concerns







 Observation  Date









 No information









 Concern  Status  Date









 No information